# Patient Record
Sex: FEMALE | Race: WHITE | NOT HISPANIC OR LATINO | Employment: OTHER | URBAN - METROPOLITAN AREA
[De-identification: names, ages, dates, MRNs, and addresses within clinical notes are randomized per-mention and may not be internally consistent; named-entity substitution may affect disease eponyms.]

---

## 2017-01-03 RX ORDER — BLOOD SUGAR DIAGNOSTIC
STRIP MISCELLANEOUS
Qty: 100 EACH | Refills: 3 | Status: SHIPPED | OUTPATIENT
Start: 2017-01-03 | End: 2017-01-04 | Stop reason: SDUPTHER

## 2017-01-04 NOTE — TELEPHONE ENCOUNTER
Pharmacy required new script to be sent in to pharmacy with an attached diagnosis code in the notes. Resending now.

## 2017-01-04 NOTE — TELEPHONE ENCOUNTER
Pharmacy called back and the script needs to have the frequency of testing on the new one as well. Reordering now to fax.

## 2017-02-13 RX ORDER — ATORVASTATIN CALCIUM 10 MG/1
TABLET, FILM COATED ORAL
Qty: 30 TABLET | Refills: 3 | Status: SHIPPED | OUTPATIENT
Start: 2017-02-13 | End: 2017-06-21

## 2017-05-15 RX ORDER — LEVOTHYROXINE SODIUM 25 MCG
TABLET ORAL
Qty: 30 TABLET | Refills: 6 | Status: SHIPPED | OUTPATIENT
Start: 2017-05-15 | End: 2017-12-27 | Stop reason: SDUPTHER

## 2017-05-30 RX ORDER — LISINOPRIL 40 MG/1
TABLET ORAL
Qty: 90 TABLET | Refills: 3 | Status: SHIPPED | OUTPATIENT
Start: 2017-05-30 | End: 2018-06-02 | Stop reason: SDUPTHER

## 2017-06-13 DIAGNOSIS — I10 ESSENTIAL HYPERTENSION: ICD-10-CM

## 2017-06-13 DIAGNOSIS — E03.9 ACQUIRED HYPOTHYROIDISM: ICD-10-CM

## 2017-06-13 DIAGNOSIS — Z00.00 HEALTH CARE MAINTENANCE: Primary | ICD-10-CM

## 2017-06-13 DIAGNOSIS — E11.29 TYPE 2 DIABETES MELLITUS WITH MICROALBUMINURIA, WITHOUT LONG-TERM CURRENT USE OF INSULIN (HCC): ICD-10-CM

## 2017-06-13 DIAGNOSIS — R80.9 TYPE 2 DIABETES MELLITUS WITH MICROALBUMINURIA, WITHOUT LONG-TERM CURRENT USE OF INSULIN (HCC): ICD-10-CM

## 2017-06-13 DIAGNOSIS — E78.2 MIXED HYPERLIPIDEMIA: ICD-10-CM

## 2017-06-15 LAB
ALBUMIN SERPL-MCNC: 4.6 G/DL (ref 3.5–5.2)
ALBUMIN/CREAT UR: 440.2 MG/G CREAT (ref 0–30)
ALBUMIN/GLOB SERPL: 1.7 G/DL
ALP SERPL-CCNC: 124 U/L (ref 39–117)
ALT SERPL-CCNC: 16 U/L (ref 1–33)
APPEARANCE UR: CLEAR
AST SERPL-CCNC: 17 U/L (ref 1–32)
BACTERIA #/AREA URNS HPF: NORMAL /HPF
BASOPHILS # BLD AUTO: 0.08 10*3/MM3 (ref 0–0.2)
BASOPHILS NFR BLD AUTO: 1.5 % (ref 0–1.5)
BILIRUB SERPL-MCNC: 0.9 MG/DL (ref 0.1–1.2)
BILIRUB UR QL STRIP: NEGATIVE
BUN SERPL-MCNC: 17 MG/DL (ref 8–23)
BUN/CREAT SERPL: 19.5 (ref 7–25)
CALCIUM SERPL-MCNC: 9.8 MG/DL (ref 8.6–10.5)
CHLORIDE SERPL-SCNC: 96 MMOL/L (ref 98–107)
CHOLEST SERPL-MCNC: 145 MG/DL (ref 0–200)
CO2 SERPL-SCNC: 26.6 MMOL/L (ref 22–29)
COLOR UR: YELLOW
CREAT SERPL-MCNC: 0.87 MG/DL (ref 0.57–1)
CREAT UR-MCNC: 42.3 MG/DL
EOSINOPHIL # BLD AUTO: 0.2 10*3/MM3 (ref 0–0.7)
EOSINOPHIL NFR BLD AUTO: 3.8 % (ref 0.3–6.2)
EPI CELLS #/AREA URNS HPF: NORMAL /HPF
ERYTHROCYTE [DISTWIDTH] IN BLOOD BY AUTOMATED COUNT: 14.3 % (ref 11.7–13)
GLOBULIN SER CALC-MCNC: 2.7 GM/DL
GLUCOSE SERPL-MCNC: 123 MG/DL (ref 65–99)
GLUCOSE UR QL: NEGATIVE
HBA1C MFR BLD: 7 % (ref 4.8–5.6)
HCT VFR BLD AUTO: 46 % (ref 35.6–45.5)
HDLC SERPL-MCNC: 69 MG/DL (ref 40–60)
HGB BLD-MCNC: 14.8 G/DL (ref 11.9–15.5)
HGB UR QL STRIP: NEGATIVE
IMM GRANULOCYTES # BLD: 0.03 10*3/MM3 (ref 0–0.03)
IMM GRANULOCYTES NFR BLD: 0.6 % (ref 0–0.5)
KETONES UR QL STRIP: NEGATIVE
LDLC SERPL CALC-MCNC: 63 MG/DL (ref 0–100)
LEUKOCYTE ESTERASE UR QL STRIP: NEGATIVE
LYMPHOCYTES # BLD AUTO: 1.58 10*3/MM3 (ref 0.9–4.8)
LYMPHOCYTES NFR BLD AUTO: 29.9 % (ref 19.6–45.3)
MCH RBC QN AUTO: 29.8 PG (ref 26.9–32)
MCHC RBC AUTO-ENTMCNC: 32.2 G/DL (ref 32.4–36.3)
MCV RBC AUTO: 92.6 FL (ref 80.5–98.2)
MICRO URNS: ABNORMAL
MICROALBUMIN UR-MCNC: 186.2 UG/ML
MONOCYTES # BLD AUTO: 0.42 10*3/MM3 (ref 0.2–1.2)
MONOCYTES NFR BLD AUTO: 7.9 % (ref 5–12)
NEUTROPHILS # BLD AUTO: 2.98 10*3/MM3 (ref 1.9–8.1)
NEUTROPHILS NFR BLD AUTO: 56.3 % (ref 42.7–76)
NITRITE UR QL STRIP: NEGATIVE
PH UR STRIP: 7 [PH] (ref 5–7.5)
PLATELET # BLD AUTO: 253 10*3/MM3 (ref 140–500)
POTASSIUM SERPL-SCNC: 4.3 MMOL/L (ref 3.5–5.2)
PROT SERPL-MCNC: 7.3 G/DL (ref 6–8.5)
PROT UR QL STRIP: ABNORMAL
RBC # BLD AUTO: 4.97 10*6/MM3 (ref 3.9–5.2)
RBC #/AREA URNS HPF: NORMAL /HPF
SODIUM SERPL-SCNC: 139 MMOL/L (ref 136–145)
SP GR UR: 1.01 (ref 1–1.03)
TRIGL SERPL-MCNC: 66 MG/DL (ref 0–150)
TSH SERPL DL<=0.005 MIU/L-ACNC: 1.52 MIU/ML (ref 0.27–4.2)
URINALYSIS REFLEX: ABNORMAL
UROBILINOGEN UR STRIP-MCNC: 0.2 MG/DL (ref 0.2–1)
VLDLC SERPL CALC-MCNC: 13.2 MG/DL (ref 5–40)
WBC # BLD AUTO: 5.29 10*3/MM3 (ref 4.5–10.7)
WBC #/AREA URNS HPF: NORMAL /HPF

## 2017-06-21 ENCOUNTER — OFFICE VISIT (OUTPATIENT)
Dept: INTERNAL MEDICINE | Facility: CLINIC | Age: 82
End: 2017-06-21

## 2017-06-21 VITALS
HEIGHT: 63 IN | BODY MASS INDEX: 21.62 KG/M2 | SYSTOLIC BLOOD PRESSURE: 138 MMHG | RESPIRATION RATE: 18 BRPM | OXYGEN SATURATION: 97 % | WEIGHT: 122 LBS | HEART RATE: 90 BPM | DIASTOLIC BLOOD PRESSURE: 88 MMHG

## 2017-06-21 DIAGNOSIS — I10 ESSENTIAL HYPERTENSION: ICD-10-CM

## 2017-06-21 DIAGNOSIS — E11.29 TYPE 2 DIABETES MELLITUS WITH MICROALBUMINURIA, WITHOUT LONG-TERM CURRENT USE OF INSULIN (HCC): ICD-10-CM

## 2017-06-21 DIAGNOSIS — Z12.31 SCREENING MAMMOGRAM, ENCOUNTER FOR: ICD-10-CM

## 2017-06-21 DIAGNOSIS — I48.0 PAROXYSMAL ATRIAL FIBRILLATION (HCC): ICD-10-CM

## 2017-06-21 DIAGNOSIS — Z00.00 HEALTH MAINTENANCE EXAMINATION: Primary | ICD-10-CM

## 2017-06-21 DIAGNOSIS — M21.962 DEFORMITY OF BOTH FEET: ICD-10-CM

## 2017-06-21 DIAGNOSIS — M21.961 DEFORMITY OF BOTH FEET: ICD-10-CM

## 2017-06-21 DIAGNOSIS — R39.11 URINARY HESITANCY: ICD-10-CM

## 2017-06-21 DIAGNOSIS — Z23 NEED FOR PNEUMOCOCCAL VACCINATION: ICD-10-CM

## 2017-06-21 DIAGNOSIS — E11.42 DIABETIC PERIPHERAL NEUROPATHY (HCC): ICD-10-CM

## 2017-06-21 DIAGNOSIS — Z00.00 MEDICARE ANNUAL WELLNESS VISIT, SUBSEQUENT: ICD-10-CM

## 2017-06-21 DIAGNOSIS — R80.9 TYPE 2 DIABETES MELLITUS WITH MICROALBUMINURIA, WITHOUT LONG-TERM CURRENT USE OF INSULIN (HCC): ICD-10-CM

## 2017-06-21 DIAGNOSIS — E78.2 MIXED HYPERLIPIDEMIA: ICD-10-CM

## 2017-06-21 DIAGNOSIS — R39.198 DIFFICULTY URINATING: ICD-10-CM

## 2017-06-21 PROCEDURE — 96160 PT-FOCUSED HLTH RISK ASSMT: CPT | Performed by: INTERNAL MEDICINE

## 2017-06-21 PROCEDURE — G0439 PPPS, SUBSEQ VISIT: HCPCS | Performed by: INTERNAL MEDICINE

## 2017-06-21 PROCEDURE — G0009 ADMIN PNEUMOCOCCAL VACCINE: HCPCS | Performed by: INTERNAL MEDICINE

## 2017-06-21 PROCEDURE — 90732 PPSV23 VACC 2 YRS+ SUBQ/IM: CPT | Performed by: INTERNAL MEDICINE

## 2017-06-21 PROCEDURE — 99397 PER PM REEVAL EST PAT 65+ YR: CPT | Performed by: INTERNAL MEDICINE
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
## 2017-06-21 NOTE — PATIENT INSTRUCTIONS
Medicare Wellness  Personal Prevention Plan of Service     Date of Office Visit:  2017  Encounter Provider:  Lashonda Prieto MD  Place of Service:  [facility] INTERNAL MEDIC[facility]  Patient Name: Natalee Klein  :  1930    As part of the Medicare Wellness portion of your visit today, we are providing you with this personalized preventive plan of services (PPPS). This plan is based upon recommendations of the [place] Preventive Services Task Force (USPSTF) and the Advisory Committee on Immunization Practices (ACIP).    This lists the preventive care services that should be considered, and provides dates of when you are due. Items listed as completed are up-to-date and do not require any further intervention.    Health Maintenance   Topic Date Due   • DIABETIC FOOT EXAM  2016   • DXA SCAN  2016   • MEDICARE ANNUAL WELLNESS  2017   • PNEUMOCOCCAL VACCINES (65+ LOW/MEDIUM RISK) (2 of 2 - PPSV23) 2017   • MAMMOGRAM  06/15/2017   • INFLUENZA VACCINE  2017   • DIABETIC EYE EXAM  09/15/2017   • HEMOGLOBIN A1C  2017   • LIPID PANEL  2018   • TDAP/TD VACCINES (2 - Td) 2026   • ZOSTER VACCINE  Addressed       No orders of the defined types were placed in this encounter.      No Follow-up on file.

## 2017-06-23 RX ORDER — ATORVASTATIN CALCIUM 10 MG/1
TABLET, FILM COATED ORAL
Qty: 30 TABLET | Refills: 3 | OUTPATIENT
Start: 2017-06-23

## 2017-10-16 RX ORDER — LANCETS
EACH MISCELLANEOUS
Qty: 102 EACH | Refills: 2 | Status: SHIPPED | OUTPATIENT
Start: 2017-10-16

## 2017-12-06 RX ORDER — BLOOD SUGAR DIAGNOSTIC
STRIP MISCELLANEOUS
Qty: 100 EACH | Refills: 3 | Status: SHIPPED | OUTPATIENT
Start: 2017-12-06 | End: 2018-01-24 | Stop reason: SDUPTHER

## 2017-12-27 RX ORDER — LEVOTHYROXINE SODIUM 25 MCG
TABLET ORAL
Qty: 30 TABLET | Refills: 6 | Status: SHIPPED | OUTPATIENT
Start: 2017-12-27 | End: 2018-10-16 | Stop reason: SDUPTHER

## 2018-01-05 DIAGNOSIS — I10 ESSENTIAL HYPERTENSION: ICD-10-CM

## 2018-01-05 DIAGNOSIS — E03.9 ACQUIRED HYPOTHYROIDISM: ICD-10-CM

## 2018-01-05 DIAGNOSIS — R80.9 TYPE 2 DIABETES MELLITUS WITH MICROALBUMINURIA, WITHOUT LONG-TERM CURRENT USE OF INSULIN (HCC): ICD-10-CM

## 2018-01-05 DIAGNOSIS — E11.29 TYPE 2 DIABETES MELLITUS WITH MICROALBUMINURIA, WITHOUT LONG-TERM CURRENT USE OF INSULIN (HCC): ICD-10-CM

## 2018-01-05 DIAGNOSIS — E78.2 MIXED HYPERLIPIDEMIA: Primary | ICD-10-CM

## 2018-01-09 LAB
ALBUMIN SERPL-MCNC: 4.9 G/DL (ref 3.5–5.2)
ALBUMIN/GLOB SERPL: 1.7 G/DL
ALP SERPL-CCNC: 156 U/L (ref 39–117)
ALT SERPL-CCNC: 22 U/L (ref 1–33)
AST SERPL-CCNC: 23 U/L (ref 1–32)
BILIRUB SERPL-MCNC: 0.9 MG/DL (ref 0.1–1.2)
BUN SERPL-MCNC: 17 MG/DL (ref 8–23)
BUN/CREAT SERPL: 22.4 (ref 7–25)
CALCIUM SERPL-MCNC: 10 MG/DL (ref 8.6–10.5)
CHLORIDE SERPL-SCNC: 100 MMOL/L (ref 98–107)
CO2 SERPL-SCNC: 27.9 MMOL/L (ref 22–29)
CREAT SERPL-MCNC: 0.76 MG/DL (ref 0.57–1)
GLOBULIN SER CALC-MCNC: 2.9 GM/DL
GLUCOSE SERPL-MCNC: 104 MG/DL (ref 65–99)
HBA1C MFR BLD: 6.5 % (ref 4.8–5.6)
POTASSIUM SERPL-SCNC: 3.9 MMOL/L (ref 3.5–5.2)
PROT SERPL-MCNC: 7.8 G/DL (ref 6–8.5)
SODIUM SERPL-SCNC: 142 MMOL/L (ref 136–145)
TSH SERPL DL<=0.005 MIU/L-ACNC: 0.98 MIU/ML (ref 0.27–4.2)

## 2018-01-11 ENCOUNTER — OFFICE VISIT (OUTPATIENT)
Dept: INTERNAL MEDICINE | Facility: CLINIC | Age: 83
End: 2018-01-11

## 2018-01-11 VITALS
WEIGHT: 113 LBS | HEART RATE: 95 BPM | OXYGEN SATURATION: 98 % | HEIGHT: 63 IN | SYSTOLIC BLOOD PRESSURE: 152 MMHG | RESPIRATION RATE: 18 BRPM | DIASTOLIC BLOOD PRESSURE: 88 MMHG | BODY MASS INDEX: 20.02 KG/M2

## 2018-01-11 DIAGNOSIS — R63.4 ABNORMAL WEIGHT LOSS: ICD-10-CM

## 2018-01-11 DIAGNOSIS — E11.29 TYPE 2 DIABETES MELLITUS WITH MICROALBUMINURIA, WITHOUT LONG-TERM CURRENT USE OF INSULIN (HCC): ICD-10-CM

## 2018-01-11 DIAGNOSIS — R80.9 TYPE 2 DIABETES MELLITUS WITH MICROALBUMINURIA, WITHOUT LONG-TERM CURRENT USE OF INSULIN (HCC): ICD-10-CM

## 2018-01-11 DIAGNOSIS — E03.9 ACQUIRED HYPOTHYROIDISM: ICD-10-CM

## 2018-01-11 DIAGNOSIS — E78.2 MIXED HYPERLIPIDEMIA: Primary | ICD-10-CM

## 2018-01-11 DIAGNOSIS — R74.8 ELEVATED ALKALINE PHOSPHATASE LEVEL: ICD-10-CM

## 2018-01-11 DIAGNOSIS — L28.2 PRURITIC RASH: ICD-10-CM

## 2018-01-11 DIAGNOSIS — I10 ESSENTIAL HYPERTENSION: ICD-10-CM

## 2018-01-11 DIAGNOSIS — I48.0 PAROXYSMAL ATRIAL FIBRILLATION (HCC): ICD-10-CM

## 2018-01-11 LAB
Lab: NORMAL
Lab: NORMAL

## 2018-01-11 PROCEDURE — 99214 OFFICE O/P EST MOD 30 MIN: CPT | Performed by: INTERNAL MEDICINE

## 2018-01-11 NOTE — PROGRESS NOTES
Chief Complaint  Natalee Klein is a 87 y.o. female who presents for Hyperlipidemia; Hypertension; Hypothyroidism; Diabetes; and Follow-up (6 month)  .    History of Present Illness   Natalee is here for follow up on her HTN, atrial fib, HLD, hypothyroid, and DM.  She denies any cp or palp or soa.  We discussed her A1c and other labs. She spent most of the appt complaining about neck pain on the right side and ear pain that she has had for years and has seen an ENT for in the past.  She also spent a great deal of time on the derm issues.   She fell getting out of a car in December and hit her tailbone.  She was really sore for a long time.  She is using a cane now.  The only meds she is taking are her lisinopril, her Vit D and her synthroid.  She takes tylenol at night.  She hasn't taken her metoprolol or her XArelto for a long time.    She saw derm and they took a lot of biopsies.  They found that she has allergies. She was given a cream that took care of the itching.  She is using a betamethasone ointment and a TMC cream.  She has better control of the itching but it's not gone.  She is supposed to have some testing on her back.  She's frustrated by the process of medicine these days.  I have tried to explain that I have no control over how the dermatologists run their practice.      She has seem [facility] for her eye.  They are working on getting her left eye to a place where they can do some laser therapy.        Review of Systems   Cardiovascular: Negative for chest pain, dyspnea on exertion, leg swelling and palpitations.   Endocrine: Negative for polydipsia and polyuria.   Musculoskeletal: Positive for back pain, joint pain and myalgias.   Neurological: Positive for loss of balance. Negative for dizziness.       Patient Active Problem List   Diagnosis   • Stenosis of carotid artery   • Degeneration of intervertebral disc of lumbar region   • Diabetes mellitus   • Hyperlipidemia   • Hypertension   •  Hypothyroidism   • Paroxysmal atrial fibrillation   • Temporary cerebral vascular dysfunction   • Pruritic rash   • Urinary hesitancy   • Difficulty urinating   • Diabetic peripheral neuropathy   • Deformity of both feet   • Abnormal weight loss   • Elevated alkaline phosphatase level       Past Medical History:   Diagnosis Date   • Carotid artery stenosis    • DDD (degenerative disc disease), lumbar    • Diabetes mellitus    • Disease of thyroid gland    • Hyperlipidemia    • Hypertension    • Hypokalemia    • Irregular heart beat    • Osteopenia    • PAF (paroxysmal atrial fibrillation)    • Pruritus    • Sciatica    • TIA (transient ischemic attack)        Past Surgical History:   Procedure Laterality Date   • CATARACT EXTRACTION     • CHOLECYSTECTOMY     • CRANIOTOMY      excision of acoustic neuroma   • FOOT SURGERY     • HEMORRHOIDECTOMY     • HYSTERECTOMY     • NEPHRECTOMY     • THYROID SURGERY         Family History   Problem Relation Age of Onset   • Stroke Brother    • Breast cancer Other    • Heart disease Other    • Mitral valve prolapse Neg Hx        Social History     Social History   • Marital status:      Spouse name: N/A   • Number of children: N/A   • Years of education: N/A     Occupational History   • Not on file.     Social History Main Topics   • Smoking status: Former Smoker   • Smokeless tobacco: Not on file   • Alcohol use No   • Drug use: No   • Sexual activity: Defer     Other Topics Concern   • Not on file     Social History Narrative       Current Outpatient Prescriptions on File Prior to Visit   Medication Sig Dispense Refill   • ACCU-CHEK FASTCLIX LANCETS misc USE 1 DAILY 102 each 2   • ACCU-CHEK SMARTVIEW test strip TEST EVERY  each 3   • acetaminophen (TYLENOL) 500 MG tablet Take 500 mg by mouth every 6 (six) hours as needed for mild pain (1-3).     • Ascorbic Acid (VITAMIN C) 500 MG capsule Take  by mouth daily.     • Blood Glucose Calibration (ACCU-CHEK SMARTVIEW  "CONTROL VI) Accu-Chek SmartView In Vitro Strip; Patient Sig: Accu-Chek SmartView In Vitro Strip USE 1 TEST STRIP EVERY DAY; 100; 3; 19-Aug-2013; Active     • Cholecalciferol (VITAMIN D PO) Take  by mouth.     • glucose blood (ACCU-CHEK SMARTVIEW) test strip Use as instructed. 100 each 3   • glucose blood (ACCU-CHEK SMARTVIEW) test strip Use 1 Test strip every day to test blood glucose. 100 each 3   • Lancets Misc. (ACCU-CHEK FASTCLIX LANCET) kit      • lisinopril (PRINIVIL,ZESTRIL) 40 MG tablet take 1 tablet by mouth once daily 90 tablet 3   • metoprolol succinate XL (TOPROL-XL) 50 MG 24 hr tablet Take 2 tablets by mouth daily. 90 tablet 3   • SYNTHROID 25 MCG tablet take 1 tablet by mouth every morning 30 tablet 6   • travoprost, KAIT free, (TRAVATAN) 0.004 % solution ophthalmic solution Apply  to eye.     • [DISCONTINUED] rivaroxaban (XARELTO) 20 MG tablet Take 1 tablet by mouth daily with dinner. 30 tablet 11     No current facility-administered medications on file prior to visit.        Allergies   Allergen Reactions   • Amlodipine    • Codeine    • Penicillins        Vitals:    01/11/18 1421   BP: 152/88   Pulse: 95   Resp: 18   SpO2: 98%   Weight: 51.3 kg (113 lb)   Height: 160 cm (62.99\")       Body mass index is 20.02 kg/(m^2).    Objective   Physical Exam   Constitutional: She is oriented to person, place, and time. No distress.   HENT:   Head: Normocephalic and atraumatic.   Right Ear: Tympanic membrane and ear canal normal.   Left Ear: Tympanic membrane and ear canal normal.   Neck: Muscular tenderness present. Decreased range of motion present.   Chronic tightening of her trapezius and SCM muscles.  She holds her head to the right chronically.  No palpable masses.    Cardiovascular: Normal rate and regular rhythm.    Pulmonary/Chest: Effort normal and breath sounds normal. No respiratory distress. She has no wheezes. She has no rales.   Musculoskeletal: She exhibits no edema.   Neurological: She is alert " and oriented to person, place, and time. No cranial nerve deficit. Gait (very antalgic, unsteady, slow gait.  ) abnormal.   Psychiatric: She has a normal mood and affect. Her behavior is normal.       Results for orders placed or performed in visit on 01/05/18   Comprehensive Metabolic Panel   Result Value Ref Range    Glucose 104 (H) 65 - 99 mg/dL    BUN 17 8 - 23 mg/dL    Creatinine 0.76 0.57 - 1.00 mg/dL    eGFR Non African Am 72 >60 mL/min/1.73    eGFR African Am 87 >60 mL/min/1.73    BUN/Creatinine Ratio 22.4 7.0 - 25.0    Sodium 142 136 - 145 mmol/L    Potassium 3.9 3.5 - 5.2 mmol/L    Chloride 100 98 - 107 mmol/L    Total CO2 27.9 22.0 - 29.0 mmol/L    Calcium 10.0 8.6 - 10.5 mg/dL    Total Protein 7.8 6.0 - 8.5 g/dL    Albumin 4.90 3.50 - 5.20 g/dL    Globulin 2.9 gm/dL    A/G Ratio 1.7 g/dL    Total Bilirubin 0.9 0.1 - 1.2 mg/dL    Alkaline Phosphatase 156 (H) 39 - 117 U/L    AST (SGOT) 23 1 - 32 U/L    ALT (SGPT) 22 1 - 33 U/L   TSH Rfx On Abnormal To Free T4   Result Value Ref Range    TSH 0.977 0.27 - 4.2 mIU/mL   Hemoglobin A1c   Result Value Ref Range    Hemoglobin A1C 6.50 (H) 4.80 - 5.60 %       Assessment/Plan   Diagnoses and all orders for this visit:    Mixed hyperlipidemia    Essential hypertension    Type 2 diabetes mellitus with microalbuminuria, without long-term current use of insulin    Acquired hypothyroidism    Paroxysmal atrial fibrillation    Pruritic rash    Abnormal weight loss    Elevated alkaline phosphatase level        Discussion/Summary  Natalee is here for routine follow up.  It was a challenge to keep her focused on the health issues at hand. Over 30 minutes was spent with her discussing past issues and what other specialists have done.  Her bp was high today but she had not had her lisinopril. Her A1c is well controlled.  Her thyroid is well controlled.   Unfortunately, given the challenge of her office visit today, I didn't notice her weight loss until now as I am writing this  note.  I will have our office contact her.  We are going to need some additional labs and possibly imaging.  Her alk phos is elevated.  We need to determine if this is from liver or bone.  Will try to add a GGT and CBC to her existing blood work.  She has stopped taking her xarelto and given her poor balance and difficulty walking I think her risks outweigh the benefits.    Return in about 6 months (around 7/11/2018) for Annual physical, with fasting labs prior.

## 2018-01-12 LAB
BASOPHILS # BLD MANUAL: 0 10*3/MM3 (ref 0–0.2)
BASOPHILS NFR BLD MANUAL: 0 % (ref 0–1.5)
DIFFERENTIAL COMMENT: ABNORMAL
EOSINOPHIL # BLD MANUAL: 0.06 10*3/MM3 (ref 0–0.7)
EOSINOPHIL NFR BLD MANUAL: 1 % (ref 0.3–6.2)
ERYTHROCYTE [DISTWIDTH] IN BLOOD BY AUTOMATED COUNT: 13.5 % (ref 11.7–13)
GGT SERPL-CCNC: 17 U/L (ref 5–36)
HCT VFR BLD AUTO: 48.4 % (ref 35.6–45.5)
HGB BLD-MCNC: 15.2 G/DL (ref 11.9–15.5)
LYMPHOCYTES # BLD MANUAL: 2.56 10*3/MM3 (ref 0.9–4.8)
LYMPHOCYTES NFR BLD MANUAL: 45 % (ref 19.6–45.3)
MCH RBC QN AUTO: 29.2 PG (ref 26.9–32)
MCHC RBC AUTO-ENTMCNC: 31.4 G/DL (ref 32.4–36.3)
MCV RBC AUTO: 92.9 FL (ref 80.5–98.2)
MONOCYTES # BLD MANUAL: 0.23 10*3/MM3 (ref 0.2–1.2)
MONOCYTES NFR BLD MANUAL: 4 % (ref 5–12)
NEUTROPHILS # BLD MANUAL: 2.84 10*3/MM3 (ref 1.9–8.1)
NEUTROPHILS NFR BLD MANUAL: 50 % (ref 42.7–76)
PLATELET # BLD AUTO: 224 10*3/MM3 (ref 140–500)
PLATELET BLD QL SMEAR: ABNORMAL
RBC # BLD AUTO: 5.21 10*6/MM3 (ref 3.9–5.2)
RBC MORPH BLD: ABNORMAL
WBC # BLD AUTO: 5.68 10*3/MM3 (ref 4.5–10.7)
WRITTEN AUTHORIZATION: NORMAL

## 2018-01-15 ENCOUNTER — TELEPHONE (OUTPATIENT)
Dept: INTERNAL MEDICINE | Facility: CLINIC | Age: 83
End: 2018-01-15

## 2018-01-15 NOTE — TELEPHONE ENCOUNTER
----- Message from Lashonda Prieto MD sent at 1/15/2018  4:13 PM EST -----  Please call - her blood counts are all ok.  Her liver enzyme is normal.  I would like to repeat her liver enzymes in a month to see if her alkaline phosphatase goes back down.

## 2018-01-24 RX ORDER — BLOOD SUGAR DIAGNOSTIC
STRIP MISCELLANEOUS
Qty: 100 EACH | Refills: 3 | Status: SHIPPED | OUTPATIENT
Start: 2018-01-24

## 2018-01-29 ENCOUNTER — TELEPHONE (OUTPATIENT)
Dept: INTERNAL MEDICINE | Facility: CLINIC | Age: 83
End: 2018-01-29

## 2018-02-15 DIAGNOSIS — R74.8 ELEVATED ALKALINE PHOSPHATASE LEVEL: Primary | ICD-10-CM

## 2018-02-16 LAB
ALBUMIN SERPL-MCNC: 4.6 G/DL (ref 3.5–5.2)
ALP SERPL-CCNC: 101 U/L (ref 39–117)
ALT SERPL-CCNC: 21 U/L (ref 1–33)
AST SERPL-CCNC: 24 U/L (ref 1–32)
BASOPHILS # BLD AUTO: 0.11 10*3/MM3 (ref 0–0.2)
BASOPHILS NFR BLD AUTO: 1.9 % (ref 0–1.5)
BILIRUB DIRECT SERPL-MCNC: 0.4 MG/DL (ref 0–0.3)
BILIRUB SERPL-MCNC: 1.3 MG/DL (ref 0.1–1.2)
EOSINOPHIL # BLD AUTO: 0.18 10*3/MM3 (ref 0–0.7)
EOSINOPHIL NFR BLD AUTO: 3.1 % (ref 0.3–6.2)
ERYTHROCYTE [DISTWIDTH] IN BLOOD BY AUTOMATED COUNT: 14.1 % (ref 11.7–13)
HCT VFR BLD AUTO: 45.9 % (ref 35.6–45.5)
HGB BLD-MCNC: 14.6 G/DL (ref 11.9–15.5)
IMM GRANULOCYTES # BLD: 0 10*3/MM3 (ref 0–0.03)
IMM GRANULOCYTES NFR BLD: 0 % (ref 0–0.5)
LYMPHOCYTES # BLD AUTO: 1.58 10*3/MM3 (ref 0.9–4.8)
LYMPHOCYTES NFR BLD AUTO: 27.1 % (ref 19.6–45.3)
MCH RBC QN AUTO: 29 PG (ref 26.9–32)
MCHC RBC AUTO-ENTMCNC: 31.8 G/DL (ref 32.4–36.3)
MCV RBC AUTO: 91.1 FL (ref 80.5–98.2)
MONOCYTES # BLD AUTO: 0.52 10*3/MM3 (ref 0.2–1.2)
MONOCYTES NFR BLD AUTO: 8.9 % (ref 5–12)
NEUTROPHILS # BLD AUTO: 3.45 10*3/MM3 (ref 1.9–8.1)
NEUTROPHILS NFR BLD AUTO: 59 % (ref 42.7–76)
PLATELET # BLD AUTO: 219 10*3/MM3 (ref 140–500)
PROT SERPL-MCNC: 7.1 G/DL (ref 6–8.5)
RBC # BLD AUTO: 5.04 10*6/MM3 (ref 3.9–5.2)
WBC # BLD AUTO: 5.84 10*3/MM3 (ref 4.5–10.7)

## 2018-02-16 NOTE — TELEPHONE ENCOUNTER
"Spoke with pt at her lab apt today.     Stated that she had seen Paramjit a couple years ago and had discussed a cscope, she would not specify if she was having any GI issues or not, would only tell me that she \"needed to have a cscope done\".   "

## 2018-02-19 ENCOUNTER — TELEPHONE (OUTPATIENT)
Dept: INTERNAL MEDICINE | Facility: CLINIC | Age: 83
End: 2018-02-19

## 2018-02-19 DIAGNOSIS — R15.9 INCONTINENCE OF FECES, UNSPECIFIED FECAL INCONTINENCE TYPE: Primary | ICD-10-CM

## 2018-02-19 DIAGNOSIS — R63.4 UNEXPLAINED WEIGHT LOSS: ICD-10-CM

## 2018-02-19 NOTE — TELEPHONE ENCOUNTER
Patient called and has some questions for you, she personally wants to speak to you. Please advise.

## 2018-02-19 NOTE — TELEPHONE ENCOUNTER
Pt stated that she is having issues with her bowel movements coming out before she gets to the toilet. Denies any change in stool/discoloration/blood. Just the incontinence. Wants to know if she needs to go to [facility] [place] for a cscope, when she saw him a couple years ago she denied one.

## 2018-02-19 NOTE — TELEPHONE ENCOUNTER
I think we should get a CT abd and pelvis before seeing him.  I will put that order in if she is ok with that.

## 2018-02-19 NOTE — TELEPHONE ENCOUNTER
----- Message from Lashonda Prieto MD sent at 2/18/2018  3:55 PM EST -----  Please call - her liver enzyme has normalized.  Her blood counts are stable.

## 2018-02-28 ENCOUNTER — HOSPITAL ENCOUNTER (OUTPATIENT)
Dept: CT IMAGING | Facility: HOSPITAL | Age: 83
Discharge: HOME | End: 2018-02-28
Admitting: INTERNAL MEDICINE

## 2018-02-28 DIAGNOSIS — R63.4 UNEXPLAINED WEIGHT LOSS: ICD-10-CM

## 2018-02-28 DIAGNOSIS — R15.9 INCONTINENCE OF FECES, UNSPECIFIED FECAL INCONTINENCE TYPE: ICD-10-CM

## 2018-02-28 LAB — CREAT BLDA-MCNC: 0.7 MG/DL (ref 0.6–1.3)

## 2018-02-28 PROCEDURE — 74177 CT ABD & PELVIS W/CONTRAST: CPT

## 2018-02-28 PROCEDURE — 82565 ASSAY OF CREATININE: CPT

## 2018-02-28 PROCEDURE — 0 IOPAMIDOL 61 % SOLUTION: Performed by: INTERNAL MEDICINE

## 2018-02-28 PROCEDURE — 0 DIATRIZOATE MEGLUMINE & SODIUM PER 1 ML: Performed by: INTERNAL MEDICINE

## 2018-02-28 RX ADMIN — DIATRIZOATE MEGLUMINE AND DIATRIZOATE SODIUM 30 ML: 660; 100 LIQUID ORAL; RECTAL at 09:50

## 2018-02-28 RX ADMIN — IOPAMIDOL 85 ML: 612 INJECTION, SOLUTION INTRAVENOUS at 11:15

## 2018-03-01 ENCOUNTER — TELEPHONE (OUTPATIENT)
Dept: INTERNAL MEDICINE | Facility: CLINIC | Age: 83
End: 2018-03-01

## 2018-03-01 DIAGNOSIS — K86.2 PANCREATIC CYST: Primary | ICD-10-CM

## 2018-03-01 NOTE — TELEPHONE ENCOUNTER
Patient wants to know the results of her test done yesterday 2/28/18. She knows Dr. Prieto is off on Friday and wants to know the results ASAP. She does not want to wait until Monday. Please advise.

## 2018-03-05 NOTE — TELEPHONE ENCOUNTER
Please call - her CT shows some cysts in her pancreas.  These have some benign features but they can't be certain.  Radiology recommended repeating a CT in 6 months to reassess.  There is no dilation of the pancreatic ducts which is reassuring.

## 2018-03-06 PROBLEM — K86.2 PANCREATIC CYST: Status: ACTIVE | Noted: 2018-03-06

## 2018-03-27 ENCOUNTER — OFFICE VISIT (OUTPATIENT)
Dept: INTERNAL MEDICINE | Facility: CLINIC | Age: 83
End: 2018-03-27

## 2018-03-27 ENCOUNTER — HOSPITAL ENCOUNTER (OUTPATIENT)
Dept: CARDIOLOGY | Facility: HOSPITAL | Age: 83
Discharge: HOME | End: 2018-03-27
Admitting: INTERNAL MEDICINE

## 2018-03-27 VITALS
HEIGHT: 63 IN | HEART RATE: 80 BPM | OXYGEN SATURATION: 98 % | DIASTOLIC BLOOD PRESSURE: 78 MMHG | WEIGHT: 110 LBS | RESPIRATION RATE: 18 BRPM | BODY MASS INDEX: 19.49 KG/M2 | SYSTOLIC BLOOD PRESSURE: 154 MMHG

## 2018-03-27 DIAGNOSIS — L53.9 ERYTHEMA OF SKIN: ICD-10-CM

## 2018-03-27 DIAGNOSIS — M25.475 SWELLING OF FOOT JOINT, LEFT: ICD-10-CM

## 2018-03-27 DIAGNOSIS — S99.922A INJURY OF LEFT FOOT, INITIAL ENCOUNTER: Primary | ICD-10-CM

## 2018-03-27 LAB
BH CV LOW VAS LEFT SAPHENOFEMORAL JUNCTION SPONT: 1
BH CV LOWER VASCULAR LEFT COMMON FEMORAL AUGMENT: NORMAL
BH CV LOWER VASCULAR LEFT COMMON FEMORAL COMPETENT: NORMAL
BH CV LOWER VASCULAR LEFT COMMON FEMORAL COMPRESS: NORMAL
BH CV LOWER VASCULAR LEFT COMMON FEMORAL PHASIC: NORMAL
BH CV LOWER VASCULAR LEFT COMMON FEMORAL SPONT: NORMAL
BH CV LOWER VASCULAR LEFT DISTAL FEMORAL COMPRESS: NORMAL
BH CV LOWER VASCULAR LEFT GASTRONEMIUS COMPRESS: NORMAL
BH CV LOWER VASCULAR LEFT GREATER SAPH AK COMPRESS: NORMAL
BH CV LOWER VASCULAR LEFT GREATER SAPH BK COMPRESS: NORMAL
BH CV LOWER VASCULAR LEFT MID FEMORAL AUGMENT: NORMAL
BH CV LOWER VASCULAR LEFT MID FEMORAL COMPETENT: NORMAL
BH CV LOWER VASCULAR LEFT MID FEMORAL COMPRESS: NORMAL
BH CV LOWER VASCULAR LEFT MID FEMORAL PHASIC: NORMAL
BH CV LOWER VASCULAR LEFT MID FEMORAL SPONT: NORMAL
BH CV LOWER VASCULAR LEFT PERONEAL COMPRESS: NORMAL
BH CV LOWER VASCULAR LEFT POPLITEAL AUGMENT: NORMAL
BH CV LOWER VASCULAR LEFT POPLITEAL COMPETENT: NORMAL
BH CV LOWER VASCULAR LEFT POPLITEAL COMPRESS: NORMAL
BH CV LOWER VASCULAR LEFT POPLITEAL PHASIC: NORMAL
BH CV LOWER VASCULAR LEFT POPLITEAL SPONT: NORMAL
BH CV LOWER VASCULAR LEFT POSTERIOR TIBIAL COMPRESS: NORMAL
BH CV LOWER VASCULAR LEFT PROXIMAL FEMORAL COMPRESS: NORMAL
BH CV LOWER VASCULAR LEFT SAPHENOFEMORAL JUNCTION AUGMENT: NORMAL
BH CV LOWER VASCULAR LEFT SAPHENOFEMORAL JUNCTION COMPETENT: NORMAL
BH CV LOWER VASCULAR LEFT SAPHENOFEMORAL JUNCTION COMPRESS: NORMAL
BH CV LOWER VASCULAR LEFT SAPHENOFEMORAL JUNCTION PHASIC: NORMAL
BH CV LOWER VASCULAR LEFT SAPHENOFEMORAL JUNCTION SPONT: NORMAL
BH CV LOWER VASCULAR RIGHT COMMON FEMORAL AUGMENT: NORMAL
BH CV LOWER VASCULAR RIGHT COMMON FEMORAL COMPETENT: NORMAL
BH CV LOWER VASCULAR RIGHT COMMON FEMORAL COMPRESS: NORMAL
BH CV LOWER VASCULAR RIGHT COMMON FEMORAL PHASIC: NORMAL
BH CV LOWER VASCULAR RIGHT COMMON FEMORAL SPONT: NORMAL
HCT VFR BLDA CALC: 44.7 %
HGB BLDA-MCNC: 14.1 G/DL
LYMPHOCYTES # BLD: 21.7 %
MCH, POC: 28
MCHC, POC: 31.5
MCV, POC: 88.6
MONOCYTES # BLD: 9.5 %
PLATELET # BLD: 251 10*3/MM3
PMV BLD: 7.6 FL
POC NEUTROPHIL: 68.8 %
RBC, POC: 5.04
RDW, POC: 15.7
WBC # BLD: 6.2 10*3/UL

## 2018-03-27 PROCEDURE — 73630 X-RAY EXAM OF FOOT: CPT | Performed by: INTERNAL MEDICINE

## 2018-03-27 PROCEDURE — 93971 EXTREMITY STUDY: CPT

## 2018-03-27 PROCEDURE — 85025 COMPLETE CBC W/AUTO DIFF WBC: CPT | Performed by: INTERNAL MEDICINE

## 2018-03-27 PROCEDURE — 99214 OFFICE O/P EST MOD 30 MIN: CPT | Performed by: INTERNAL MEDICINE

## 2018-03-27 NOTE — PROGRESS NOTES
Chief Complaint  Natalee Klein is a 87 y.o. female who presents for Foot Injury (Dropped a wooden log on her foot last week, now swollen. )  .    History of Present Illness   Natalee is here for acute care for a new issue.  She dropped a log on her left foot at home on Monday a week ago.  It started to swell and turn red on Sunday.  It's tender when she tries to put her shoe on.  She can walk on it.  Her foot is red and warm.  It's not normally this red.  It's not normally swollen like this.  She has a lot of questions about her dermatologist and the patch testing.  I have explained that she needs to follow up with her dermatologist for this.        Review of Systems   Cardiovascular: Positive for leg swelling (left foot).   Skin: Positive for color change.   Musculoskeletal: Positive for joint pain.       Patient Active Problem List   Diagnosis   • Stenosis of carotid artery   • Degeneration of intervertebral disc of lumbar region   • Diabetes mellitus   • Hyperlipidemia   • Hypertension   • Hypothyroidism   • Paroxysmal atrial fibrillation   • Temporary cerebral vascular dysfunction   • Pruritic rash   • Urinary hesitancy   • Difficulty urinating   • Diabetic peripheral neuropathy   • Deformity of both feet   • Abnormal weight loss   • Elevated alkaline phosphatase level   • Pancreatic cyst       Past Medical History:   Diagnosis Date   • Carotid artery stenosis    • DDD (degenerative disc disease), lumbar    • Diabetes mellitus    • Disease of thyroid gland    • Hyperlipidemia    • Hypertension    • Hypokalemia    • Irregular heart beat    • Osteopenia    • PAF (paroxysmal atrial fibrillation)    • Pruritus    • Sciatica    • TIA (transient ischemic attack)        Past Surgical History:   Procedure Laterality Date   • CATARACT EXTRACTION     • CHOLECYSTECTOMY     • CRANIOTOMY      excision of acoustic neuroma   • FOOT SURGERY     • HEMORRHOIDECTOMY     • HYSTERECTOMY     • NEPHRECTOMY     • THYROID SURGERY          Family History   Problem Relation Age of Onset   • Stroke Brother    • Breast cancer Other    • Heart disease Other    • Mitral valve prolapse Neg Hx        Social History     Social History   • Marital status:      Spouse name: N/A   • Number of children: N/A   • Years of education: N/A     Occupational History   • Not on file.     Social History Main Topics   • Smoking status: Former Smoker   • Smokeless tobacco: Not on file   • Alcohol use No   • Drug use: No   • Sexual activity: Defer     Other Topics Concern   • Not on file     Social History Narrative   • No narrative on file       Current Outpatient Prescriptions on File Prior to Visit   Medication Sig Dispense Refill   • ACCU-CHEK FASTCLIX LANCETS misc USE 1 DAILY 102 each 2   • ACCU-CHEK SMARTVIEW test strip TEST EVERY  each 3   • acetaminophen (TYLENOL) 500 MG tablet Take 500 mg by mouth every 6 (six) hours as needed for mild pain (1-3).     • Ascorbic Acid (VITAMIN C) 500 MG capsule Take  by mouth daily.     • Blood Glucose Calibration (ACCU-CHEK SMARTVIEW CONTROL VI) Accu-Chek SmartView In Vitro Strip; Patient Sig: Accu-Chek SmartView In Vitro Strip USE 1 TEST STRIP EVERY DAY; 100; 3; 19-Aug-2013; Active     • Cholecalciferol (VITAMIN D PO) Take  by mouth.     • glucose blood (ACCU-CHEK SMARTVIEW) test strip Use as instructed. 100 each 3   • glucose blood (ACCU-CHEK SMARTVIEW) test strip Use 1 Test strip every day to test blood glucose. 100 each 3   • Lancets Misc. (ACCU-CHEK FASTCLIX LANCET) kit      • lisinopril (PRINIVIL,ZESTRIL) 40 MG tablet take 1 tablet by mouth once daily 90 tablet 3   • metoprolol succinate XL (TOPROL-XL) 50 MG 24 hr tablet Take 2 tablets by mouth daily. 90 tablet 3   • SYNTHROID 25 MCG tablet take 1 tablet by mouth every morning 30 tablet 6   • travoprost, BAK free, (TRAVATAN) 0.004 % solution ophthalmic solution Apply  to eye.       No current facility-administered medications on file prior to visit.   "      Allergies   Allergen Reactions   • Amlodipine    • Codeine    • Penicillins        Vitals:    03/27/18 1131   BP: 154/78   Pulse: 80   Resp: 18   SpO2: 98%   Weight: 49.9 kg (110 lb)   Height: 160 cm (62.99\")       Body mass index is 19.49 kg/m².    Objective   Physical Exam     Vascular Status -  Her left foot exhibits abnormal foot edema. Her left foot exhibits normal foot vasculature .  Skin Integrity  -   Her left foot skin is not intact..         Results for orders placed or performed in visit on 03/27/18   POC CBC With / Auto Diff   Result Value Ref Range    WBC 6.2     RBC 5.04     Hemoglobin 14.1 g/dL    Hematocrit 44.7 %    MCV 88.6     MCH 28.0     MCHC 31.5     RDW-CV 15.7     MPV 7.6     Platelets 251 10*3/mm3    Neutrophil Rel % 68.8 %    Monocyte Rel % 9.5 %    Lymphocyte Rel % 21.7 %     Xray left foot three views  Reason for exam: foot injury and pain  Comparison: none  Impression:  No fx    Assessment/Plan   Diagnoses and all orders for this visit:    Injury of left foot, initial encounter  -     XR Foot 3+ View Left (In Office)  -     POC CBC With / Auto Diff  -     Duplex Venous Lower Extremity - Left CAR    Swelling of foot joint, left  -     POC CBC With / Auto Diff  -     Duplex Venous Lower Extremity - Left CAR    Erythema of skin  -     POC CBC With / Auto Diff  -     Duplex Venous Lower Extremity - Left CAR        Discussion/Summary  Natalee is here for acute care for a new issue.  I do not see any fracture on her xrays.  Given the delay in the swelling and pain, I am concerned about possible blood clot.  Her WBC is normal with a normal diff.  I don't think this is cellulitis.  She is scheduled for a doppler today at 4.  Her son is with her and I have explained all of this to him.  Natalee has a lot of questions about her cyst on her pancreas as well.  I have explained that the radiologist recommended another CT in 6 mo.  We already have this ordered.  I have explained this to her son as " well.    No Follow-up on file.

## 2018-03-28 ENCOUNTER — TELEPHONE (OUTPATIENT)
Dept: INTERNAL MEDICINE | Facility: CLINIC | Age: 83
End: 2018-03-28

## 2018-03-28 NOTE — TELEPHONE ENCOUNTER
----- Message from Lashonda Prieto MD sent at 3/28/2018  7:59 AM EDT -----  Please call and make sure they understand that the doppler was negative for clot.  She needs to just put cold compresses on her foot twice a day for the next few days.  If the redness and swelling progresses, RTC for further evaluation.

## 2018-03-29 ENCOUNTER — TELEPHONE (OUTPATIENT)
Dept: INTERNAL MEDICINE | Facility: CLINIC | Age: 83
End: 2018-03-29

## 2018-03-29 NOTE — TELEPHONE ENCOUNTER
----- Message from Lashonda Prieto MD sent at 3/29/2018 12:37 PM EDT -----  Please call - her foot xray does not show any fracture.  Has the swelling and redness improved?

## 2018-04-02 ENCOUNTER — TELEPHONE (OUTPATIENT)
Dept: INTERNAL MEDICINE | Facility: CLINIC | Age: 83
End: 2018-04-02

## 2018-04-02 NOTE — TELEPHONE ENCOUNTER
Pt called and stated that her foot is a little better, the redness is better, but it is  and she can move it.

## 2018-04-12 ENCOUNTER — OFFICE VISIT (OUTPATIENT)
Dept: INTERNAL MEDICINE | Facility: CLINIC | Age: 83
End: 2018-04-12

## 2018-04-12 VITALS
HEIGHT: 63 IN | WEIGHT: 110 LBS | DIASTOLIC BLOOD PRESSURE: 84 MMHG | BODY MASS INDEX: 19.49 KG/M2 | HEART RATE: 91 BPM | SYSTOLIC BLOOD PRESSURE: 154 MMHG | OXYGEN SATURATION: 98 % | RESPIRATION RATE: 18 BRPM

## 2018-04-12 DIAGNOSIS — G89.29 CHRONIC PAIN OF BOTH SHOULDERS: ICD-10-CM

## 2018-04-12 DIAGNOSIS — M79.89 SWELLING OF LEFT LOWER EXTREMITY: ICD-10-CM

## 2018-04-12 DIAGNOSIS — M25.612 DECREASED RANGE OF MOTION OF LEFT SHOULDER: ICD-10-CM

## 2018-04-12 DIAGNOSIS — M79.672 LEFT FOOT PAIN: Primary | ICD-10-CM

## 2018-04-12 DIAGNOSIS — R22.1 NECK MASS: ICD-10-CM

## 2018-04-12 DIAGNOSIS — M25.511 CHRONIC PAIN OF BOTH SHOULDERS: ICD-10-CM

## 2018-04-12 DIAGNOSIS — M25.512 CHRONIC PAIN OF BOTH SHOULDERS: ICD-10-CM

## 2018-04-12 PROCEDURE — 73630 X-RAY EXAM OF FOOT: CPT | Performed by: INTERNAL MEDICINE

## 2018-04-12 PROCEDURE — 99214 OFFICE O/P EST MOD 30 MIN: CPT | Performed by: INTERNAL MEDICINE

## 2018-04-12 NOTE — PROGRESS NOTES
Chief Complaint  Natalee Klein is a 87 y.o. female who presents for Foot Injury (Pt was seen last for a foot injury, dropped a log on her foot. Still swollen and red. ) and Mass (Pt has a bump in the right side of her neck. Has had it for a long time. Sore when she turns her head, does not bother her to swallow. )  .    History of Present Illness   Natalee is here for acute care for an ongoing issue.  Her left foot is still swelling and painful.  She is able to walk on it.  We discussed again the results of her doppler and her xray.  She also has a mass on her neck on the right side that she wants to have evaluated.  She notices this when she turns her head.  It's tender when she pushes on it.      Review of Systems   Constitution: Negative for chills, fever, night sweats, weight gain and weight loss.   Cardiovascular: Positive for leg swelling (LLE).   Skin: Positive for color change (purplish discoloration in her foot). Negative for rash.   Musculoskeletal: Negative for falls and joint pain.       Patient Active Problem List   Diagnosis   • Stenosis of carotid artery   • Degeneration of intervertebral disc of lumbar region   • Diabetes mellitus   • Hyperlipidemia   • Hypertension   • Hypothyroidism   • Paroxysmal atrial fibrillation   • Temporary cerebral vascular dysfunction   • Pruritic rash   • Urinary hesitancy   • Difficulty urinating   • Diabetic peripheral neuropathy   • Deformity of both feet   • Abnormal weight loss   • Elevated alkaline phosphatase level   • Pancreatic cyst       Past Medical History:   Diagnosis Date   • Carotid artery stenosis    • DDD (degenerative disc disease), lumbar    • Diabetes mellitus    • Disease of thyroid gland    • Hyperlipidemia    • Hypertension    • Hypokalemia    • Irregular heart beat    • Osteopenia    • PAF (paroxysmal atrial fibrillation)    • Pruritus    • Sciatica    • TIA (transient ischemic attack)        Past Surgical History:   Procedure Laterality Date   •  CATARACT EXTRACTION     • CHOLECYSTECTOMY     • CRANIOTOMY      excision of acoustic neuroma   • FOOT SURGERY     • HEMORRHOIDECTOMY     • HYSTERECTOMY     • NEPHRECTOMY     • THYROID SURGERY         Family History   Problem Relation Age of Onset   • Stroke Brother    • Breast cancer Other    • Heart disease Other    • Mitral valve prolapse Neg Hx        Social History     Social History   • Marital status:      Spouse name: N/A   • Number of children: N/A   • Years of education: N/A     Occupational History   • Not on file.     Social History Main Topics   • Smoking status: Former Smoker   • Smokeless tobacco: Not on file   • Alcohol use No   • Drug use: No   • Sexual activity: Defer     Other Topics Concern   • Not on file     Social History Narrative   • No narrative on file       Current Outpatient Prescriptions on File Prior to Visit   Medication Sig Dispense Refill   • ACCU-CHEK FASTCLIX LANCETS misc USE 1 DAILY 102 each 2   • ACCU-CHEK SMARTVIEW test strip TEST EVERY  each 3   • acetaminophen (TYLENOL) 500 MG tablet Take 500 mg by mouth every 6 (six) hours as needed for mild pain (1-3).     • Ascorbic Acid (VITAMIN C) 500 MG capsule Take  by mouth daily.     • Blood Glucose Calibration (ACCU-CHEK SMARTVIEW CONTROL VI) Accu-Chek SmartView In Vitro Strip; Patient Sig: Accu-Chek SmartView In Vitro Strip USE 1 TEST STRIP EVERY DAY; 100; 3; 19-Aug-2013; Active     • Cholecalciferol (VITAMIN D PO) Take  by mouth.     • glucose blood (ACCU-CHEK SMARTVIEW) test strip Use as instructed. 100 each 3   • glucose blood (ACCU-CHEK SMARTVIEW) test strip Use 1 Test strip every day to test blood glucose. 100 each 3   • Lancets Misc. (ACCU-CHEK FASTCLIX LANCET) kit      • lisinopril (PRINIVIL,ZESTRIL) 40 MG tablet take 1 tablet by mouth once daily 90 tablet 3   • metoprolol succinate XL (TOPROL-XL) 50 MG 24 hr tablet Take 2 tablets by mouth daily. 90 tablet 3   • SYNTHROID 25 MCG tablet take 1 tablet by mouth  "every morning 30 tablet 6   • travoprost, KAIT free, (TRAVATAN) 0.004 % solution ophthalmic solution Apply  to eye.       No current facility-administered medications on file prior to visit.        Allergies   Allergen Reactions   • Amlodipine    • Codeine    • Penicillins        Vitals:    04/12/18 1112   BP: 154/84   Pulse: 91   Resp: 18   SpO2: 98%   Weight: 49.9 kg (110 lb)   Height: 160 cm (62.99\")       Body mass index is 19.49 kg/m².    Objective   Physical Exam   Constitutional: She appears well-developed and well-nourished.   Neck: Neck supple. No erythema present.         Vascular Status -  Her left foot exhibits abnormal foot edema (1+ ). Her left foot exhibits normal foot vasculature .  Skin Integrity  -   Her left foot skin is not intact..      Results for orders placed or performed in visit on 03/27/18   POC CBC With / Auto Diff   Result Value Ref Range    WBC 6.2     RBC 5.04     Hemoglobin 14.1 g/dL    Hematocrit 44.7 %    MCV 88.6     MCH 28.0     MCHC 31.5     RDW-CV 15.7     MPV 7.6     Platelets 251 10*3/mm3    Neutrophil Rel % 68.8 %    Monocyte Rel % 9.5 %    Lymphocyte Rel % 21.7 %   Duplex Venous Lower Extremity - Left CAR   Result Value Ref Range    Right Common Femoral Spont Y     Right Common Femoral Phasic Y     Right Common Femoral Augment Y     Right Common Femoral Competent Y     Right Common Femoral Compress C     Left Common Femoral Spont Y     Left Common Femoral Phasic Y     Left Common Femoral Augment Y     Left Common Femoral Competent Y     Left Common Femoral Compress C     Left Saphenofemoral Junction Spont Y     Left Saphenofemoral Junction Phasic Y     Left Saphenofemoral Junction Augment Y     Left Saphenofemoral Junction Competent N     Left Saphenofemoral Junction Compress C     Left Proximal Femoral Compress C     Left Mid Femoral Spont Y     Left Mid Femoral Phasic Y     Left Mid Femoral Augment Y     Left Mid Femoral Competent Y     Left Mid Femoral Compress C     Left " Distal Femoral Compress C     Left Popliteal Spont Y     Left Popliteal Phasic Y     Left Popliteal Augment Y     Left Popliteal Competent Y     Left Popliteal Compress C     Left Posterior Tibial Compress C     Left Peroneal Compress C     Left GastronemiusSoleal Compress C     Left Greater Saph AK Compress C     Left Greater Saph BK Compress C     Left Saphenofemoral Junction Spont 1      xr left foot 3 v  Reason for exam:  Continued pain and swelling  Comparison: 3/27/18  Impression: no fx    Assessment/Plan   Diagnoses and all orders for this visit:    Left foot pain  -     XR Foot 3+ View Left (In Office)    Swelling of left lower extremity  -     XR Foot 3+ View Left (In Office)    Neck mass  -     CT soft tissue neck w contrast; Future        Discussion/Summary  Natalee is here for follow up on her foot.  She definitely has some venous insufficiency causing swelling.  I still do not see anything on the xray that's concerning.  I have asked her to get a compression sock from [employer] for her left leg.  Her son was with her today and I relayed this information to him as well.   We are going to get imaging of her neck  No Follow-up on file.

## 2018-04-16 ENCOUNTER — TELEPHONE (OUTPATIENT)
Dept: INTERNAL MEDICINE | Facility: CLINIC | Age: 83
End: 2018-04-16

## 2018-04-16 NOTE — TELEPHONE ENCOUNTER
----- Message from Lashonda Prieto MD sent at 4/14/2018 12:01 PM EDT -----  Please call - her foot xray is still without any evidence of fracture.

## 2018-04-17 ENCOUNTER — HOSPITAL ENCOUNTER (OUTPATIENT)
Dept: CT IMAGING | Facility: HOSPITAL | Age: 83
Discharge: HOME | End: 2018-04-17
Admitting: INTERNAL MEDICINE

## 2018-04-17 DIAGNOSIS — R22.1 NECK MASS: ICD-10-CM

## 2018-04-17 LAB — CREAT BLDA-MCNC: 0.8 MG/DL (ref 0.6–1.3)

## 2018-04-17 PROCEDURE — 70491 CT SOFT TISSUE NECK W/DYE: CPT

## 2018-04-17 PROCEDURE — 82565 ASSAY OF CREATININE: CPT

## 2018-04-17 PROCEDURE — 25010000002 IOPAMIDOL 61 % SOLUTION: Performed by: INTERNAL MEDICINE

## 2018-04-17 RX ADMIN — IOPAMIDOL 75 ML: 612 INJECTION, SOLUTION INTRAVENOUS at 11:22

## 2018-04-19 ENCOUNTER — TELEPHONE (OUTPATIENT)
Dept: INTERNAL MEDICINE | Facility: CLINIC | Age: 83
End: 2018-04-19

## 2018-04-19 ENCOUNTER — APPOINTMENT (OUTPATIENT)
Dept: CT IMAGING | Facility: HOSPITAL | Age: 83
End: 2018-04-19

## 2018-04-19 DIAGNOSIS — E04.1 THYROID NODULE: Primary | ICD-10-CM

## 2018-04-19 NOTE — TELEPHONE ENCOUNTER
----- Message from Lashonda Prieto MD sent at 4/19/2018  1:30 PM EDT -----  Please call - she has a 2.5 cm thyroid nodule that needs to be further evaluated with u/s and thyroid uptake scan.  I will put those orders in.  I would also call her son so he is aware of the plan as well.

## 2018-04-24 ENCOUNTER — HOSPITAL ENCOUNTER (OUTPATIENT)
Dept: ULTRASOUND IMAGING | Facility: HOSPITAL | Age: 83
End: 2018-04-24

## 2018-04-26 ENCOUNTER — TELEPHONE (OUTPATIENT)
Dept: INTERNAL MEDICINE | Facility: CLINIC | Age: 83
End: 2018-04-26

## 2018-04-26 ENCOUNTER — APPOINTMENT (OUTPATIENT)
Dept: PHYSICAL THERAPY | Facility: HOSPITAL | Age: 83
End: 2018-04-26

## 2018-04-26 NOTE — TELEPHONE ENCOUNTER
Pt wanted to inform us that her u/s and thyroid uptake scan are scheduled on May 16th. She will be off her synthroid for 21 days.

## 2018-05-10 ENCOUNTER — HOSPITAL ENCOUNTER (OUTPATIENT)
Dept: PHYSICAL THERAPY | Facility: HOSPITAL | Age: 83
Setting detail: THERAPIES SERIES
Discharge: HOME | End: 2018-05-10

## 2018-05-10 DIAGNOSIS — M25.511 BILATERAL SHOULDER PAIN, UNSPECIFIED CHRONICITY: Primary | ICD-10-CM

## 2018-05-10 DIAGNOSIS — M25.512 BILATERAL SHOULDER PAIN, UNSPECIFIED CHRONICITY: Primary | ICD-10-CM

## 2018-05-10 DIAGNOSIS — Z78.9 DIFFICULTY WITH ACTIVITIES OF DAILY LIVING: ICD-10-CM

## 2018-05-10 DIAGNOSIS — M25.619 DECREASED SHOULDER MOBILITY, UNSPECIFIED LATERALITY: ICD-10-CM

## 2018-05-10 PROCEDURE — 97140 MANUAL THERAPY 1/> REGIONS: CPT | Performed by: PHYSICAL THERAPIST

## 2018-05-10 PROCEDURE — 97110 THERAPEUTIC EXERCISES: CPT | Performed by: PHYSICAL THERAPIST

## 2018-05-10 PROCEDURE — G8984 CARRY CURRENT STATUS: HCPCS | Performed by: PHYSICAL THERAPIST

## 2018-05-10 PROCEDURE — G8985 CARRY GOAL STATUS: HCPCS | Performed by: PHYSICAL THERAPIST

## 2018-05-10 PROCEDURE — 97162 PT EVAL MOD COMPLEX 30 MIN: CPT | Performed by: PHYSICAL THERAPIST
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
## 2018-05-16 ENCOUNTER — HOSPITAL ENCOUNTER (OUTPATIENT)
Dept: ULTRASOUND IMAGING | Facility: HOSPITAL | Age: 83
Discharge: HOME | End: 2018-05-16
Admitting: INTERNAL MEDICINE

## 2018-05-16 ENCOUNTER — HOSPITAL ENCOUNTER (OUTPATIENT)
Dept: NUCLEAR MEDICINE | Facility: HOSPITAL | Age: 83
Discharge: HOME | End: 2018-05-16

## 2018-05-16 DIAGNOSIS — E04.1 THYROID NODULE: ICD-10-CM

## 2018-05-16 PROCEDURE — 76536 US EXAM OF HEAD AND NECK: CPT

## 2018-05-16 PROCEDURE — A9516 IODINE I-123 SOD IODIDE MIC: HCPCS | Performed by: INTERNAL MEDICINE

## 2018-05-16 PROCEDURE — 0 SODIUM IODIDE 3.7 MBQ CAPSULE: Performed by: INTERNAL MEDICINE

## 2018-05-16 RX ORDER — SODIUM IODIDE I 123 100 UCI/1
1 CAPSULE, GELATIN COATED ORAL
Status: COMPLETED | OUTPATIENT
Start: 2018-05-16 | End: 2018-05-16

## 2018-05-16 RX ADMIN — Medication 1 CAPSULE: at 13:45

## 2018-05-17 ENCOUNTER — HOSPITAL ENCOUNTER (OUTPATIENT)
Dept: NUCLEAR MEDICINE | Facility: HOSPITAL | Age: 83
Discharge: HOME | End: 2018-05-17

## 2018-05-17 PROCEDURE — 78014 THYROID IMAGING W/BLOOD FLOW: CPT

## 2018-05-18 ENCOUNTER — HOSPITAL ENCOUNTER (OUTPATIENT)
Dept: PHYSICAL THERAPY | Facility: HOSPITAL | Age: 83
Setting detail: THERAPIES SERIES
Discharge: HOME | End: 2018-05-18

## 2018-05-18 DIAGNOSIS — M25.512 BILATERAL SHOULDER PAIN, UNSPECIFIED CHRONICITY: ICD-10-CM

## 2018-05-18 DIAGNOSIS — M25.619 DECREASED SHOULDER MOBILITY, UNSPECIFIED LATERALITY: Primary | ICD-10-CM

## 2018-05-18 DIAGNOSIS — Z78.9 DIFFICULTY WITH ACTIVITIES OF DAILY LIVING: ICD-10-CM

## 2018-05-18 DIAGNOSIS — M25.511 BILATERAL SHOULDER PAIN, UNSPECIFIED CHRONICITY: ICD-10-CM

## 2018-05-18 PROCEDURE — 97110 THERAPEUTIC EXERCISES: CPT | Performed by: PHYSICAL THERAPIST

## 2018-05-18 NOTE — THERAPY TREATMENT NOTE
Outpatient Physical Therapy Ortho Treatment   [facility]     Patient Name: Natalee Klein  : 1930  MRN: [id number]  Today's Date: 2018      Visit Date: 2018    Visit Dx:    ICD-10-CM ICD-9-CM   1. Decreased shoulder mobility, unspecified laterality M25.619 719.51   2. Bilateral shoulder pain, unspecified chronicity M25.511 719.41    M25.512    3. Difficulty with activities of daily living R53.81 799.3       Patient Active Problem List   Diagnosis   • Stenosis of carotid artery   • Degeneration of intervertebral disc of lumbar region   • Diabetes mellitus   • Hyperlipidemia   • Hypertension   • Hypothyroidism   • Paroxysmal atrial fibrillation   • Temporary cerebral vascular dysfunction   • Pruritic rash   • Urinary hesitancy   • Difficulty urinating   • Diabetic peripheral neuropathy   • Deformity of both feet   • Abnormal weight loss   • Elevated alkaline phosphatase level   • Pancreatic cyst        Past Medical History:   Diagnosis Date   • Carotid artery stenosis    • DDD (degenerative disc disease), lumbar    • Diabetes mellitus    • Disease of thyroid gland    • Hyperlipidemia    • Hypertension    • Hypokalemia    • Irregular heart beat    • Osteopenia    • PAF (paroxysmal atrial fibrillation)    • Pruritus    • Sciatica    • TIA (transient ischemic attack)         Past Surgical History:   Procedure Laterality Date   • CATARACT EXTRACTION     • CHOLECYSTECTOMY     • CRANIOTOMY      excision of acoustic neuroma   • FOOT SURGERY     • HEMORRHOIDECTOMY     • HYSTERECTOMY     • NEPHRECTOMY     • THYROID SURGERY                               PT Assessment/Plan     Row Name 18 1455          PT Assessment    Assessment Comments Patient returns for 1st follow up.  Patient with poor retention of initial HEP. Requires verbal, tactile and demo cues for all therex.  Fair tolerance to seated table slides and pulleys (facing) for AAROM.    -GR        PT Plan    PT Plan Comments Assess  response to new therex and progress with AAROM, gentle strengthening within patient tolerance.  Patient is a fall risk.  -GR       User Key  (r) = Recorded By, (t) = Taken By, (c) = Cosigned By    Initials Name Provider Type    GR Carlos Choudhary, PT Physical Therapist                    Exercises     Row Name 05/18/18 1300             Subjective Comments    Subjective Comments States no pain at rest, hurts when she moves, especially coffee this AM.  Doing the scapular retraction at home has trouble remembering the one on her side.  -GR         Subjective Pain    Able to rate subjective pain? yes  -GR      Pre-Treatment Pain Level 0  -GR      Post-Treatment Pain Level 0  -GR      Subjective Pain Comment Fall risk  -GR         Exercise 1    Exercise Name 1 Nustep UE focused/LE used  -GR      Cueing 1 Verbal;Tactile;Demo  -GR      Time 1 5 minutes  -GR      Additional Comments L1  -GR         Exercise 2    Exercise Name 2 Reverse shoulder rolls  -GR      Cueing 2 Verbal;Tactile;Demo  -GR      Reps 2 10  -GR         Exercise 3    Exercise Name 3 Scap retraction  -GR      Cueing 3 Verbal;Tactile;Demo  -GR      Reps 3 10  -GR         Exercise 4    Exercise Name 4 Rows and ext  -GR      Cueing 4 Verbal;Tactile;Demo  -GR      Reps 4 10  -GR      Additional Comments YTB  -GR         Exercise 5    Exercise Name 5 Bicep curl  -GR      Cueing 5 Verbal;Tactile;Demo  -GR      Reps 5 10  -GR      Additional Comments 1#  -GR         Exercise 6    Exercise Name 6 Seated pulleys flexion - facing pulleys  -GR      Cueing 6 Verbal;Tactile;Demo  -GR      Time 6 2 minutes  -GR      Additional Comments seated on top of 2 foam pads  -GR         Exercise 7    Exercise Name 7 Seated table slides - swiss ball and towel  -GR      Cueing 7 Verbal;Tactile;Demo  -GR      Reps 7 10  -GR      Additional Comments each- OK to trial towel at dining table at home  -GR         Exercise 8    Exercise Name 8 SL ER  -GR      Cueing 8 Verbal;Tactile;Demo   -GR      Sets 8 1  -GR      Reps 8 10  -GR      Additional Comments each  -GR        User Key  (r) = Recorded By, (t) = Taken By, (c) = Cosigned By    Initials Name Provider Type    ANA Choudhary PT Physical Therapist                               PT OP Goals     Row Name 05/18/18 1400          PT Short Term Goals    STG 1 Patient will be independent with initial HEP for posture and ROM/flexibility  -GR     STG 1 Progress Ongoing  -GR     STG 2 Patient will be educated on and demonstrate knowledge/understanding of optimal posture and shoulder positioning to avoid undue strain to affected tissues with daily function  -GR     STG 2 Progress Ongoing  -GR     STG 3 Patient will report >/= 25% increased ease/decreased pain with ADL's  -GR     STG 3 Progress Ongoing  -GR     STG 4 Patient will demonstrate improving postural/core strength as noted by more erect posture in clinic >/= 50% of session with minimal cuing.  -GR     STG 4 Progress Ongoing  -GR        Long Term Goals    LTG 1 Patient will be independent with established HEP for postural/shoulder/scapular strength/stabilization to aid in self management of condition/symptoms  -GR     LTG 1 Progress Ongoing  -GR     LTG 2 Patient will have improved shoulder girdle strength to 3+/5 to 4-/5 in available ROM for greater ease/tolerance with functional activities   -GR     LTG 2 Progress Ongoing  -GR     LTG 3 Patient will have improved AROM shoulder elevation for greater ease with reaching into cabinets and microwave without pain > 5/10   -GR     LTG 3 Progress Ongoing  -GR     LTG 4 Patient will report reduced functional disability using DASH with score </=  -GR     LTG 4 Progress Ongoing  -GR       User Key  (r) = Recorded By, (t) = Taken By, (c) = Cosigned By    Initials Name Provider Type    ANA Choudhary PT Physical Therapist          Therapy Education  Education Details: review of initial HEP, DOMS expectations with exercise  Given: HEP,  Symptoms/condition management  Program: Reinforced  How Provided: Verbal  Provided to: Patient  Level of Understanding: Teach back education performed, Demonstrated              Time Calculation:   Start Time: 1315  Stop Time: 1400  Time Calculation (min): 45 min  Total Timed Code Minutes- PT: 45 minute(s)    Therapy Charges for Today     Code Description Service Date Service Provider Modifiers Qty    69970212159 HC PT THER PROC EA 15 MIN 5/18/2018 Carlos Choudhary, PT GP 3                    Carlos Choudhary, PT  5/18/2018

## 2018-05-21 ENCOUNTER — TELEPHONE (OUTPATIENT)
Dept: INTERNAL MEDICINE | Facility: CLINIC | Age: 83
End: 2018-05-21

## 2018-05-22 ENCOUNTER — TELEPHONE (OUTPATIENT)
Dept: INTERNAL MEDICINE | Facility: CLINIC | Age: 83
End: 2018-05-22

## 2018-05-22 ENCOUNTER — APPOINTMENT (OUTPATIENT)
Dept: PHYSICAL THERAPY | Facility: HOSPITAL | Age: 83
End: 2018-05-22

## 2018-05-22 NOTE — TELEPHONE ENCOUNTER
----- Message from Lashonda Prieto MD sent at 5/21/2018  9:02 AM EDT -----  Please call - her thyroid up take scan was ok.  She does have extensive nodules in the remaining thyroid.  We could have her see Dr. Goyal to determine if anything further needs to be done with these.

## 2018-05-30 ENCOUNTER — HOSPITAL ENCOUNTER (OUTPATIENT)
Dept: PHYSICAL THERAPY | Facility: HOSPITAL | Age: 83
Setting detail: THERAPIES SERIES
Discharge: HOME | End: 2018-05-30

## 2018-05-30 DIAGNOSIS — M25.619 DECREASED SHOULDER MOBILITY, UNSPECIFIED LATERALITY: Primary | ICD-10-CM

## 2018-05-30 DIAGNOSIS — M25.511 BILATERAL SHOULDER PAIN, UNSPECIFIED CHRONICITY: ICD-10-CM

## 2018-05-30 DIAGNOSIS — M25.512 BILATERAL SHOULDER PAIN, UNSPECIFIED CHRONICITY: ICD-10-CM

## 2018-05-30 DIAGNOSIS — Z78.9 DIFFICULTY WITH ACTIVITIES OF DAILY LIVING: ICD-10-CM

## 2018-05-30 PROCEDURE — 97110 THERAPEUTIC EXERCISES: CPT

## 2018-05-30 PROCEDURE — 97140 MANUAL THERAPY 1/> REGIONS: CPT

## 2018-05-30 NOTE — THERAPY TREATMENT NOTE
Outpatient Physical Therapy Ortho Treatment Note   [facility]     Patient Name: Natalee Klein  : 1930  MRN: [id number]  Today's Date: 2018      Visit Date: 2018    Visit Dx:    ICD-10-CM ICD-9-CM   1. Decreased shoulder mobility, unspecified laterality M25.619 719.51   2. Bilateral shoulder pain, unspecified chronicity M25.511 719.41    M25.512    3. Difficulty with activities of daily living R53.81 799.3       Patient Active Problem List   Diagnosis   • Stenosis of carotid artery   • Degeneration of intervertebral disc of lumbar region   • Diabetes mellitus   • Hyperlipidemia   • Hypertension   • Hypothyroidism   • Paroxysmal atrial fibrillation   • Temporary cerebral vascular dysfunction   • Pruritic rash   • Urinary hesitancy   • Difficulty urinating   • Diabetic peripheral neuropathy   • Deformity of both feet   • Abnormal weight loss   • Elevated alkaline phosphatase level   • Pancreatic cyst        Past Medical History:   Diagnosis Date   • Carotid artery stenosis    • DDD (degenerative disc disease), lumbar    • Diabetes mellitus    • Disease of thyroid gland    • Hyperlipidemia    • Hypertension    • Hypokalemia    • Irregular heart beat    • Osteopenia    • PAF (paroxysmal atrial fibrillation)    • Pruritus    • Sciatica    • TIA (transient ischemic attack)         Past Surgical History:   Procedure Laterality Date   • CATARACT EXTRACTION     • CHOLECYSTECTOMY     • CRANIOTOMY      excision of acoustic neuroma   • FOOT SURGERY     • HEMORRHOIDECTOMY     • HYSTERECTOMY     • NEPHRECTOMY     • THYROID SURGERY                               PT Assessment/Plan     Row Name 18 1200          PT Assessment    Assessment Comments Pt's posture is extremely kyphotic and it affects her shoulders/scapulae positioning;  her son mentioned that she falls asleep in her recliner with her head hanging forward so we discussed the importance of lying back in the recliner so if she falls asleep  her head is supported.  Worked on facilitating mid traps and rhomboid muscles for scapular retraction while not engaging UT's, required copious verbal and tactile cuing.  Pt demonstrates substitution pattern with R shoulder elevation.  Pt is not steady on her feet and would benefit from using an AD.  -JA        PT Plan    PT Plan Comments cont with increasing awareness and control of scapular stabilizers, gentle RC strengthening, AAROM, and manual techniques, MH to neck/UT in supine if pt response was good; recommend continuing therapy  -JA       User Key  (r) = Recorded By, (t) = Taken By, (c) = Cosigned By    Initials Name Provider Type    LEVI Moss, PT Physical Therapist                Modalities     Row Name 05/30/18 1100             Subjective Pain    Pre-Treatment Pain Level 0  -JA      Subjective Pain Comment pain when she raises it 6/10  -JA         Moist Heat    MH Applied Yes  -JA      Location neck/UT in supine (required extra layer of towel)  -JA      Rx Minutes 10 mins  -JA      MH S/P Rx Yes  -JA        User Key  (r) = Recorded By, (t) = Taken By, (c) = Cosigned By    Initials Name Provider Type    LEVI Moss, PT Physical Therapist                Exercises     Row Name 05/30/18 1100             Subjective Comments    Subjective Comments It might be a little bit better.  -JA         Subjective Pain    Able to rate subjective pain? yes  -JA      Pre-Treatment Pain Level 0  -JA      Subjective Pain Comment pain when she raises it 6/10  -JA         Exercise 1    Exercise Name 1 Nustep UE focused/LE used  -JA      Cueing 1 Verbal;Tactile;Demo  -JA      Time 1 5 minutes  -JA      Additional Comments L3-L1  -JA         Exercise 2    Exercise Name 2 Reverse shoulder rolls  -JA      Cueing 2 Verbal;Tactile;Demo  -JA      Reps 2 10  -JA         Exercise 3    Exercise Name 3 Scap retraction  -JA      Cueing 3 Verbal;Tactile;Demo  -JA      Reps 3 10  -JA      Additional Comments copious  tactile cuing to facilitate rhomboids and to inhibit UTs  -JA         Exercise 4    Exercise Name 4 Wall slide micah shld w/towel  -JA      Cueing 4 Verbal;Tactile;Demo  -JA      Reps 4 5  -JA      Additional Comments --  -JA         Exercise 5    Exercise Name 5 Bicep curl  -JA      Cueing 5 --  -JA      Reps 5 --  -JA         Exercise 6    Exercise Name 6 Seated pulleys flexion - facing pulleys  -JA      Cueing 6 Verbal;Tactile;Demo  -JA      Time 6 2 minutes  -JA      Additional Comments seated on top of 2 foam pads  -JA         Exercise 7    Exercise Name 7 reverse shld rolls  -JA      Cueing 7 --  -JA      Reps 7 10  -JA         Exercise 8    Exercise Name 8 SL ER  -JA      Cueing 8 Verbal;Tactile;Demo  -JA      Sets 8 1  -JA      Reps 8 10  -JA      Additional Comments each; several cues required for form  -JA         Exercise 9    Exercise Name 9 added hooklying chest press w/cane  -JA      Cueing 9 Verbal;Tactile  -JA      Reps 9 10  -JA         Exercise 10    Exercise Name 10 added Hitchhiker AROM ER with arm at side, bent elbow  -JA      Reps 10 10  -JA        User Key  (r) = Recorded By, (t) = Taken By, (c) = Cosigned By    Initials Name Provider Type    LEVI Moss, PT Physical Therapist                        Manual Rx (last 36 hours)      Manual Treatments     Row Name 05/30/18 1100             Manual Rx 1    Manual Rx 1 Location Micah shoulder  -JA      Manual Rx 1 Type PROM all planes, gentle oscillations, attempted rhythmic stabilization on R but pt unable to perform/tolerate however she was able to on the L; multiple angle isometrics each shoulder with light resistance  -        User Key  (r) = Recorded By, (t) = Taken By, (c) = Cosigned By    Initials Name Provider Type    LEVI Moss, PT Physical Therapist                PT OP Goals     Row Name 05/30/18 1100          PT Short Term Goals    STG 1 Patient will be independent with initial HEP for posture and ROM/flexibility  -   "   STG 1 Progress Progressing  -     STG 1 Progress Comments added rev shld rolls and scap squeezes and AROM ER with arm at side \"hitchhiker  -     STG 2 Patient will be educated on and demonstrate knowledge/understanding of optimal posture and shoulder positioning to avoid undue strain to affected tissues with daily function  -JA     STG 2 Progress Ongoing  -JA     STG 2 Progress Comments requires reinforcement  -JA     STG 3 Patient will report >/= 25% increased ease/decreased pain with ADL's  -JA     STG 3 Progress Ongoing  -     STG 3 Progress Comments not yet  -JA     STG 4 Patient will demonstrate improving postural/core strength as noted by more erect posture in clinic >/= 50% of session with minimal cuing.  -JA     STG 4 Progress Ongoing  -     STG 4 Progress Comments required copious verbal and tactile cues; son reports pt falls asleep in chair with head hanging forward  -        Long Term Goals    LTG 1 Patient will be independent with established HEP for postural/shoulder/scapular strength/stabilization to aid in self management of condition/symptoms  -     LTG 1 Progress Ongoing  -     LTG 2 Patient will have improved shoulder girdle strength to 3+/5 to 4-/5 in available ROM for greater ease/tolerance with functional activities   -     LTG 2 Progress Ongoing  -     LTG 2 Progress Comments not yet  -     LTG 3 Patient will have improved AROM shoulder elevation for greater ease with reaching into cabinets and microwave without pain > 5/10   -     LTG 3 Progress Ongoing  -     LTG 3 Progress Comments not yet  -     LTG 4 Patient will report reduced functional disability using DASH with score </=  -     LTG 4 Progress Ongoing  -       User Key  (r) = Recorded By, (t) = Taken By, (c) = Cosigned By    Initials Name Provider Type    LEVI Moss, PT Physical Therapist          Therapy Education  Education Details: discussed use of heat for soreness and tension in UT's; " progressed exercises; pt's son brought up that pt falls asleep in her recliner and her head hangs forward--discussed  with pt that the head is the weight of a bowling ball pulling down on the spine and increasing her already rounded posture and adding strain and pain to muscles; suggested reclining chair and also placing small pillow or twel roll behind LB.  Given: HEP, Symptoms/condition management  Program: Progressed  How Provided: Verbal, Demonstration, Written  Provided to: Patient  Level of Understanding: Teach back education performed              Time Calculation:   Start Time: 1055  Stop Time: 1148  Time Calculation (min): 53 min  Total Timed Code Minutes- PT: 43 minute(s)    Therapy Charges for Today     Code Description Service Date Service Provider Modifiers Qty    00281809125 HC PT THER PROC EA 15 MIN 5/30/2018 Merna Moss, PT GP 2    14511727861 HC PT MANUAL THERAPY EA 15 MIN 5/30/2018 Merna Moss, PT GP 1    50782077377 HC PT HOT OR COLD PACK TREAT MCARE 5/30/2018 Merna Moss, PT GP 1                    Merna Moss PT  5/30/2018

## 2018-06-01 ENCOUNTER — HOSPITAL ENCOUNTER (OUTPATIENT)
Dept: PHYSICAL THERAPY | Facility: HOSPITAL | Age: 83
Setting detail: THERAPIES SERIES
Discharge: HOME | End: 2018-06-01

## 2018-06-01 DIAGNOSIS — M25.511 BILATERAL SHOULDER PAIN, UNSPECIFIED CHRONICITY: ICD-10-CM

## 2018-06-01 DIAGNOSIS — M25.619 DECREASED SHOULDER MOBILITY, UNSPECIFIED LATERALITY: Primary | ICD-10-CM

## 2018-06-01 DIAGNOSIS — Z78.9 DIFFICULTY WITH ACTIVITIES OF DAILY LIVING: ICD-10-CM

## 2018-06-01 DIAGNOSIS — M25.512 BILATERAL SHOULDER PAIN, UNSPECIFIED CHRONICITY: ICD-10-CM

## 2018-06-01 PROCEDURE — 97110 THERAPEUTIC EXERCISES: CPT | Performed by: PHYSICAL THERAPIST

## 2018-06-01 NOTE — THERAPY TREATMENT NOTE
Outpatient Physical Therapy Ortho Treatment Note  [facility]     Patient Name: Natalee Klein  : 1930  MRN: [id number]  Today's Date: 2018      Visit Date: 2018    Visit Dx:    ICD-10-CM ICD-9-CM   1. Decreased shoulder mobility, unspecified laterality M25.619 719.51   2. Bilateral shoulder pain, unspecified chronicity M25.511 719.41    M25.512    3. Difficulty with activities of daily living R53.81 799.3       Patient Active Problem List   Diagnosis   • Stenosis of carotid artery   • Degeneration of intervertebral disc of lumbar region   • Diabetes mellitus   • Hyperlipidemia   • Hypertension   • Hypothyroidism   • Paroxysmal atrial fibrillation   • Temporary cerebral vascular dysfunction   • Pruritic rash   • Urinary hesitancy   • Difficulty urinating   • Diabetic peripheral neuropathy   • Deformity of both feet   • Abnormal weight loss   • Elevated alkaline phosphatase level   • Pancreatic cyst        Past Medical History:   Diagnosis Date   • Carotid artery stenosis    • DDD (degenerative disc disease), lumbar    • Diabetes mellitus    • Disease of thyroid gland    • Hyperlipidemia    • Hypertension    • Hypokalemia    • Irregular heart beat    • Osteopenia    • PAF (paroxysmal atrial fibrillation)    • Pruritus    • Sciatica    • TIA (transient ischemic attack)         Past Surgical History:   Procedure Laterality Date   • CATARACT EXTRACTION     • CHOLECYSTECTOMY     • CRANIOTOMY      excision of acoustic neuroma   • FOOT SURGERY     • HEMORRHOIDECTOMY     • HYSTERECTOMY     • NEPHRECTOMY     • THYROID SURGERY                               PT Assessment/Plan     Row Name 18 1425          PT Assessment    Assessment Comments Patient continues to require tactile cueing throughout for UT inhibition and scapular facilitation.  Compensates shoulder abduction, deltoid, biceps and UT.  Audible crepitus with AAROM noted.  -GR        PT Plan    PT Plan Comments Continue POC within  tolerance.  -GR       User Key  (r) = Recorded By, (t) = Taken By, (c) = Cosigned By    Initials Name Provider Type    GR Carlos T Funmi, PT Physical Therapist                Modalities     Row Name 06/01/18 1300             Moist Heat    MH Applied Yes  -GR      Location cervical MHP in sitting  -GR      Rx Minutes Other:   5 mins  -GR      MH Prior to Rx Yes  -GR        User Key  (r) = Recorded By, (t) = Taken By, (c) = Cosigned By    Initials Name Provider Type    GR Carlos T Funmi, PT Physical Therapist                Exercises     Row Name 06/01/18 1300             Subjective Comments    Subjective Comments I can't rate the pain as a number. Sometimes it is worse than others.  -GR         Subjective Pain    Able to rate subjective pain? yes  -GR      Pre-Treatment Pain Level 0  -GR      Subjective Pain Comment at rest  -GR         Exercise 1    Exercise Name 1 Nustep UE/LE  -GR      Time 1 5 minutes  -GR      Additional Comments L1  -GR         Exercise 2    Exercise Name 2 Reverse shoulder rolls  -GR      Cueing 2 Verbal;Tactile;Demo  -GR      Reps 2 10  -GR         Exercise 3    Exercise Name 3 Scap retraction  -GR      Cueing 3 Verbal;Tactile;Demo  -GR      Reps 3 10  -GR         Exercise 4    Exercise Name 4 Wall slide kera shld w/towel  -GR      Cueing 4 Verbal;Tactile;Demo  -GR      Reps 4 10  -GR         Exercise 5    Exercise Name 5 Bicep curl  -GR      Cueing 5 Demo  -GR      Reps 5 10  -GR      Additional Comments 1# each  -GR         Exercise 6    Exercise Name 6 Seated pulleys flexion - facing pulleys  -GR      Cueing 6 Verbal;Tactile;Demo  -GR      Time 6 2 minutes  -GR         Exercise 7    Exercise Name 7 rows  -GR      Cueing 7 Verbal;Tactile;Demo  -GR      Reps 7 10  -GR      Additional Comments RTB  -GR         Exercise 8    Exercise Name 8 SL ER  -GR      Cueing 8 Verbal;Tactile;Demo  -GR      Sets 8 1  -GR      Reps 8 10  -GR      Additional Comments each side  -GR         Exercise 9     "Exercise Name 9 chest press - legs over wedge  -GR      Reps 9 10  -GR         Exercise 10    Exercise Name 10 standing \"hitch-hiker\" ER  -GR      Cueing 10 Verbal;Tactile;Demo  -GR      Reps 10 10  -GR      Additional Comments each side  -GR         Exercise 11    Exercise Name 11 AAROM B flex wand - legs over wedge  -GR      Cueing 11 Verbal;Tactile;Demo  -GR      Reps 11 7  -GR        User Key  (r) = Recorded By, (t) = Taken By, (c) = Cosigned By    Initials Name Provider Type    GR Carlos Choudhary, PT Physical Therapist                               PT OP Goals     Row Name 06/01/18 1400          PT Short Term Goals    STG 1 Patient will be independent with initial HEP for posture and ROM/flexibility  -GR     STG 1 Progress Progressing  -GR     STG 2 Patient will be educated on and demonstrate knowledge/understanding of optimal posture and shoulder positioning to avoid undue strain to affected tissues with daily function  -GR     STG 2 Progress Ongoing  -GR     STG 3 Patient will report >/= 25% increased ease/decreased pain with ADL's  -GR     STG 3 Progress Ongoing  -GR     STG 4 Patient will demonstrate improving postural/core strength as noted by more erect posture in clinic >/= 50% of session with minimal cuing.  -GR     STG 4 Progress Ongoing  -GR        Long Term Goals    LTG 1 Patient will be independent with established HEP for postural/shoulder/scapular strength/stabilization to aid in self management of condition/symptoms  -GR     LTG 1 Progress Ongoing  -GR     LTG 2 Patient will have improved shoulder girdle strength to 3+/5 to 4-/5 in available ROM for greater ease/tolerance with functional activities   -GR     LTG 2 Progress Ongoing  -GR     LTG 3 Patient will have improved AROM shoulder elevation for greater ease with reaching into cabinets and microwave without pain > 5/10   -GR     LTG 3 Progress Ongoing  -GR     LTG 4 Patient will report reduced functional disability using DASH with score </=  " -GR     LTG 4 Progress Ongoing  -GR       User Key  (r) = Recorded By, (t) = Taken By, (c) = Cosigned By    Initials Name Provider Type    GR Carlos Choudhary PT Physical Therapist          Therapy Education  Education Details: reinforced heat at home for pain control  Given: Symptoms/condition management  Program: Reinforced  How Provided: Verbal  Provided to: Patient  Level of Understanding: Teach back education performed, Verbalized              Time Calculation:   Start Time: 1315  Stop Time: 1405  Time Calculation (min): 50 min  Total Timed Code Minutes- PT: 45 minute(s)    Therapy Charges for Today     Code Description Service Date Service Provider Modifiers Qty    69311419770  PT THER PROC EA 15 MIN 6/1/2018 Carlos Choudhary, PT GP 3    [id number]  PT HOT OR COLD PACK TREAT MCARE 6/1/2018 Carlos Choudhary, PT GP 1                    Carlos Choudhary, PT  6/1/2018

## 2018-06-04 RX ORDER — LISINOPRIL 40 MG/1
TABLET ORAL
Qty: 90 TABLET | Refills: 3 | Status: SHIPPED | OUTPATIENT
Start: 2018-06-04 | End: 2019-05-17 | Stop reason: SDUPTHER

## 2018-06-07 ENCOUNTER — OFFICE VISIT (OUTPATIENT)
Dept: INTERNAL MEDICINE | Facility: CLINIC | Age: 83
End: 2018-06-07

## 2018-06-07 ENCOUNTER — APPOINTMENT (OUTPATIENT)
Dept: PHYSICAL THERAPY | Facility: HOSPITAL | Age: 83
End: 2018-06-07

## 2018-06-07 VITALS
WEIGHT: 101 LBS | HEART RATE: 78 BPM | BODY MASS INDEX: 17.89 KG/M2 | DIASTOLIC BLOOD PRESSURE: 72 MMHG | HEIGHT: 63 IN | RESPIRATION RATE: 18 BRPM | OXYGEN SATURATION: 98 % | SYSTOLIC BLOOD PRESSURE: 134 MMHG

## 2018-06-07 DIAGNOSIS — E03.9 ACQUIRED HYPOTHYROIDISM: ICD-10-CM

## 2018-06-07 DIAGNOSIS — R42 DIZZINESS: Primary | ICD-10-CM

## 2018-06-07 PROCEDURE — 99214 OFFICE O/P EST MOD 30 MIN: CPT | Performed by: INTERNAL MEDICINE

## 2018-06-07 RX ORDER — ONDANSETRON 4 MG/1
4 TABLET, FILM COATED ORAL EVERY 8 HOURS PRN
Qty: 15 TABLET | Refills: 0 | Status: SHIPPED | OUTPATIENT
Start: 2018-06-07 | End: 2019-05-10

## 2018-06-07 NOTE — PROGRESS NOTES
Chief Complaint  Natalee Klein is a 87 y.o. female who presents for Dizziness; Nausea (Pt states she has been sick since sunday afternoon and feeling dizzy. Pt has not been able to keep food down. ); and Fall (Couldn't walk without falling. Skinned her left arm, denies any other injuries. )  .    History of Present Illness   She has been dizzy since Sunday after [employer].  She fell at home.  She didn't hit her head or hurt herself at all.  She has been very dizzy since then. She isn't dizzy sitting down or lying down.  It's just when she is standing or walking.   She hasn't been able to eat at all due to nausea.  She tried crackers and escobar.  She did drink some mild and had an egg which did stay down.  She doesn't feel lightheaded or presyncopal.  She just feels like her balance is completely off.  Prior to this happening, she has been eating little bits.  Her grandson brought her today.  She wants to start looking at assisted living.  She doesn't feel like she can really live on her own well any longer.          Review of Systems   Constitution: Positive for decreased appetite, malaise/fatigue and weight loss. Negative for weakness.   Musculoskeletal: Positive for falls.   Gastrointestinal: Positive for nausea. Negative for bloating, abdominal pain and vomiting.   Neurological: Positive for dizziness and vertigo. Negative for headaches and light-headedness.       Patient Active Problem List   Diagnosis   • Stenosis of carotid artery   • Degeneration of intervertebral disc of lumbar region   • Diabetes mellitus   • Hyperlipidemia   • Hypertension   • Hypothyroidism   • Paroxysmal atrial fibrillation   • Temporary cerebral vascular dysfunction   • Pruritic rash   • Urinary hesitancy   • Difficulty urinating   • Diabetic peripheral neuropathy   • Deformity of both feet   • Abnormal weight loss   • Elevated alkaline phosphatase level   • Pancreatic cyst       Past Medical History:   Diagnosis Date   • Carotid artery  stenosis    • DDD (degenerative disc disease), lumbar    • Diabetes mellitus    • Disease of thyroid gland    • Hyperlipidemia    • Hypertension    • Hypokalemia    • Irregular heart beat    • Osteopenia    • PAF (paroxysmal atrial fibrillation)    • Pruritus    • Sciatica    • TIA (transient ischemic attack)        Past Surgical History:   Procedure Laterality Date   • CATARACT EXTRACTION     • CHOLECYSTECTOMY     • CRANIOTOMY      excision of acoustic neuroma   • FOOT SURGERY     • HEMORRHOIDECTOMY     • HYSTERECTOMY     • NEPHRECTOMY     • THYROID SURGERY         Family History   Problem Relation Age of Onset   • Stroke Brother    • Breast cancer Other    • Heart disease Other    • Mitral valve prolapse Neg Hx        Social History     Social History   • Marital status:      Spouse name: N/A   • Number of children: N/A   • Years of education: N/A     Occupational History   • Not on file.     Social History Main Topics   • Smoking status: Former Smoker   • Smokeless tobacco: Not on file   • Alcohol use No   • Drug use: No   • Sexual activity: Defer     Other Topics Concern   • Not on file     Social History Narrative   • No narrative on file       Current Outpatient Prescriptions on File Prior to Visit   Medication Sig Dispense Refill   • ACCU-CHEK FASTCLIX LANCETS misc USE 1 DAILY 102 each 2   • ACCU-CHEK SMARTVIEW test strip TEST EVERY  each 3   • acetaminophen (TYLENOL) 500 MG tablet Take 500 mg by mouth every 6 (six) hours as needed for mild pain (1-3).     • Ascorbic Acid (VITAMIN C) 500 MG capsule Take  by mouth daily.     • Blood Glucose Calibration (ACCU-CHEK SMARTVIEW CONTROL VI) Accu-Chek SmartView In Vitro Strip; Patient Sig: Accu-Chek SmartView In Vitro Strip USE 1 TEST STRIP EVERY DAY; 100; 3; 19-Aug-2013; Active     • Cholecalciferol (VITAMIN D PO) Take  by mouth.     • glucose blood (ACCU-CHEK SMARTVIEW) test strip Use as instructed. 100 each 3   • glucose blood (ACCU-CHEK SMARTVIEW)  "test strip Use 1 Test strip every day to test blood glucose. 100 each 3   • Lancets Mis. (ACCU-CHEK FASTCLIX LANCET) kit      • lisinopril (PRINIVIL,ZESTRIL) 40 MG tablet take 1 tablet by mouth once daily 90 tablet 3   • metoprolol succinate XL (TOPROL-XL) 50 MG 24 hr tablet Take 2 tablets by mouth daily. 90 tablet 3   • SYNTHROID 25 MCG tablet take 1 tablet by mouth every morning 30 tablet 6   • travoprost, KAIT free, (TRAVATAN) 0.004 % solution ophthalmic solution Apply  to eye.       No current facility-administered medications on file prior to visit.        Allergies   Allergen Reactions   • Amlodipine    • Codeine    • Penicillins        Vitals:    06/07/18 1334   BP: 134/72   Pulse: 78   Resp: 18   SpO2: 98%   Weight: 45.8 kg (101 lb)   Height: 160 cm (62.99\")       Body mass index is 17.9 kg/m².    Objective   Physical Exam   Constitutional: She is oriented to person, place, and time. No distress.   Neck: Neck supple.   Cardiovascular: Normal rate and regular rhythm.  Exam reveals no friction rub.    No murmur heard.  Pulmonary/Chest: Effort normal and breath sounds normal. No respiratory distress. She has no wheezes.   Abdominal: Soft. Bowel sounds are normal. She exhibits no distension. There is no tenderness.   Lymphadenopathy:     She has no cervical adenopathy.   Neurological: She is alert and oriented to person, place, and time. No cranial nerve deficit.   Skin: Abrasion (back of left upper arm, no sign of infection) noted. She is not diaphoretic.   Psychiatric: She has a normal mood and affect. Her behavior is normal.       Results for orders placed or performed during the hospital encounter of 04/17/18   POC Creatinine   Result Value Ref Range    Creatinine 0.80 0.60 - 1.30 mg/dL       Assessment/Plan   Diagnoses and all orders for this visit:    Dizziness  -     Comprehensive Metabolic Panel  -     CBC & Differential    Acquired hypothyroidism  -     TSH    Other orders  -     ondansetron (ZOFRAN) 4 " MG tablet; Take 1 tablet by mouth Every 8 (Eight) Hours As Needed for Nausea or Vomiting.        Discussion/Summary  Natalee is here for acute care for a new issue.  I am going to check some labs today.  I have encouraged her to try to increase PO intake and to use zofran if that helps.  If her labs look ok and she remains dizzy, will send to results for vestibular therapy.  I have encouraged her to pursue assisted living with her family.    No Follow-up on file.

## 2018-06-08 LAB
ALBUMIN SERPL-MCNC: 4.3 G/DL (ref 3.5–5.2)
ALBUMIN/GLOB SERPL: 1.7 G/DL
ALP SERPL-CCNC: 94 U/L (ref 39–117)
ALT SERPL-CCNC: 13 U/L (ref 1–33)
AST SERPL-CCNC: 16 U/L (ref 1–32)
BASOPHILS # BLD AUTO: 0.14 10*3/MM3 (ref 0–0.2)
BASOPHILS NFR BLD AUTO: 2.3 % (ref 0–1.5)
BILIRUB SERPL-MCNC: 0.8 MG/DL (ref 0.1–1.2)
BUN SERPL-MCNC: 20 MG/DL (ref 8–23)
BUN/CREAT SERPL: 23.5 (ref 7–25)
CALCIUM SERPL-MCNC: 9.4 MG/DL (ref 8.6–10.5)
CHLORIDE SERPL-SCNC: 102 MMOL/L (ref 98–107)
CO2 SERPL-SCNC: 24.8 MMOL/L (ref 22–29)
CREAT SERPL-MCNC: 0.85 MG/DL (ref 0.57–1)
EOSINOPHIL # BLD AUTO: 0.32 10*3/MM3 (ref 0–0.7)
EOSINOPHIL NFR BLD AUTO: 5.2 % (ref 0.3–6.2)
ERYTHROCYTE [DISTWIDTH] IN BLOOD BY AUTOMATED COUNT: 14.3 % (ref 11.7–13)
GFR SERPLBLD CREATININE-BSD FMLA CKD-EPI: 63 ML/MIN/1.73
GFR SERPLBLD CREATININE-BSD FMLA CKD-EPI: 77 ML/MIN/1.73
GLOBULIN SER CALC-MCNC: 2.6 GM/DL
GLUCOSE SERPL-MCNC: 94 MG/DL (ref 65–99)
HCT VFR BLD AUTO: 43.6 % (ref 35.6–45.5)
HGB BLD-MCNC: 14.3 G/DL (ref 11.9–15.5)
IMM GRANULOCYTES # BLD: 0.01 10*3/MM3 (ref 0–0.03)
IMM GRANULOCYTES NFR BLD: 0.2 % (ref 0–0.5)
LYMPHOCYTES # BLD AUTO: 1.4 10*3/MM3 (ref 0.9–4.8)
LYMPHOCYTES NFR BLD AUTO: 22.6 % (ref 19.6–45.3)
MCH RBC QN AUTO: 29.3 PG (ref 26.9–32)
MCHC RBC AUTO-ENTMCNC: 32.8 G/DL (ref 32.4–36.3)
MCV RBC AUTO: 89.3 FL (ref 80.5–98.2)
MONOCYTES # BLD AUTO: 0.38 10*3/MM3 (ref 0.2–1.2)
MONOCYTES NFR BLD AUTO: 6.1 % (ref 5–12)
NEUTROPHILS # BLD AUTO: 3.95 10*3/MM3 (ref 1.9–8.1)
NEUTROPHILS NFR BLD AUTO: 63.8 % (ref 42.7–76)
PLATELET # BLD AUTO: 193 10*3/MM3 (ref 140–500)
POTASSIUM SERPL-SCNC: 4.2 MMOL/L (ref 3.5–5.2)
PROT SERPL-MCNC: 6.9 G/DL (ref 6–8.5)
RBC # BLD AUTO: 4.88 10*6/MM3 (ref 3.9–5.2)
SODIUM SERPL-SCNC: 143 MMOL/L (ref 136–145)
TSH SERPL DL<=0.005 MIU/L-ACNC: 0.78 MIU/ML (ref 0.27–4.2)
WBC # BLD AUTO: 6.19 10*3/MM3 (ref 4.5–10.7)

## 2018-06-12 ENCOUNTER — APPOINTMENT (OUTPATIENT)
Dept: PHYSICAL THERAPY | Facility: HOSPITAL | Age: 83
End: 2018-06-12

## 2018-06-12 ENCOUNTER — TELEPHONE (OUTPATIENT)
Dept: INTERNAL MEDICINE | Facility: CLINIC | Age: 83
End: 2018-06-12

## 2018-06-12 NOTE — TELEPHONE ENCOUNTER
----- Message from Lashonda Prieto MD sent at 6/10/2018  7:44 PM EDT -----  Please call - thyroid, kidney and liver function, and blood counts are all ok.

## 2018-06-14 ENCOUNTER — HOSPITAL ENCOUNTER (OUTPATIENT)
Dept: PHYSICAL THERAPY | Facility: HOSPITAL | Age: 83
Setting detail: THERAPIES SERIES
Discharge: HOME | End: 2018-06-14

## 2018-06-14 DIAGNOSIS — M25.619 DECREASED SHOULDER MOBILITY, UNSPECIFIED LATERALITY: Primary | ICD-10-CM

## 2018-06-14 DIAGNOSIS — M25.511 BILATERAL SHOULDER PAIN, UNSPECIFIED CHRONICITY: ICD-10-CM

## 2018-06-14 DIAGNOSIS — Z78.9 DIFFICULTY WITH ACTIVITIES OF DAILY LIVING: ICD-10-CM

## 2018-06-14 DIAGNOSIS — M25.512 BILATERAL SHOULDER PAIN, UNSPECIFIED CHRONICITY: ICD-10-CM

## 2018-06-14 PROCEDURE — 97110 THERAPEUTIC EXERCISES: CPT | Performed by: PHYSICAL THERAPIST

## 2018-06-14 NOTE — THERAPY PROGRESS REPORT/RE-CERT
Outpatient Physical Therapy Ortho Treatment Note  [facility]     Patient Name: Natalee Klein  : 1930  MRN: [id number]  Today's Date: 2018      Visit Date: 2018    Visit Dx:    ICD-10-CM ICD-9-CM   1. Decreased shoulder mobility, unspecified laterality M25.619 719.51   2. Bilateral shoulder pain, unspecified chronicity M25.511 719.41    M25.512    3. Difficulty with activities of daily living R53.81 799.3       Patient Active Problem List   Diagnosis   • Stenosis of carotid artery   • Degeneration of intervertebral disc of lumbar region   • Diabetes mellitus   • Hyperlipidemia   • Hypertension   • Hypothyroidism   • Paroxysmal atrial fibrillation   • Temporary cerebral vascular dysfunction   • Pruritic rash   • Urinary hesitancy   • Difficulty urinating   • Diabetic peripheral neuropathy   • Deformity of both feet   • Abnormal weight loss   • Elevated alkaline phosphatase level   • Pancreatic cyst        Past Medical History:   Diagnosis Date   • Carotid artery stenosis    • DDD (degenerative disc disease), lumbar    • Diabetes mellitus    • Disease of thyroid gland    • Hyperlipidemia    • Hypertension    • Hypokalemia    • Irregular heart beat    • Osteopenia    • PAF (paroxysmal atrial fibrillation)    • Pruritus    • Sciatica    • TIA (transient ischemic attack)         Past Surgical History:   Procedure Laterality Date   • CATARACT EXTRACTION     • CHOLECYSTECTOMY     • CRANIOTOMY      excision of acoustic neuroma   • FOOT SURGERY     • HEMORRHOIDECTOMY     • HYSTERECTOMY     • NEPHRECTOMY     • THYROID SURGERY                               PT Assessment/Plan     Row Name 18 1416          PT Assessment    Functional Limitations Performance in self-care ADL;Limitation in home management  -RA     Impairments Pain;Posture;Impaired postural alignment;Impaired flexibility;Muscle strength;Range of motion  -RA     Assessment Comments Patient has attended evaluation and 4 f/u therapy  sessions for treatment of B shoulder pain.   She notes maybe a little improvement in her shoulders but continues to have pain/limitations with functional reaching activities.  She completes therapy exercises for her shoulder with noted compensatory strategies (2/2 postural changes and weakness).  She requires frequent verbal/tactile and visual cuing throughout session to improve quality of exercise form/technique.  She may benefit from additional therapy for further education and progression of HEP.   Note: Patient and son report near fall when walking from car into therapy building before today's appt. as well as 2 falls at home in the past week or two.  She is using/carrying quad cane but was assisted around clinic in facility w/c this visit.  Patient strongly encouraged to use her walker for all mobility (states she is using more at home).  Discussed home safety and that patient probably should not continue to live alone - son states they are in the process of looking into assisted living for patient.  Patient completes therapy exercises for her shoulder with noted compensatory strategies and requires frequent verbal/tactile and visual cuing throughout session.    -RA     Rehab Potential Fair  -RA     Patient/caregiver participated in establishment of treatment plan and goals Yes  -RA     Patient would benefit from skilled therapy intervention Yes  -RA        PT Plan    PT Frequency 1x/week;2x/week   2-4 additional weeks  -RA     Planned CPT's? PT NEUROMUSC RE-EDUCATION EA 15 MIN: 15725;PT MANUAL THERAPY EA 15 MIN: 15545;PT THER PROC EA 15 MIN: 07176;PT THER ACT EA 15 MIN: 35686;PT SELF CARE/HOME MGMT/TRAIN EA 15: 56002;PT HOT OR COLD PACK TREAT MCARE  -RA     PT Plan Comments Continue therapy as beneficial and patient tolerates.  -RA       User Key  (r) = Recorded By, (t) = Taken By, (c) = Cosigned By    Initials Name Provider Type    RA [place] Dvaid, PT Physical Therapist                Modalities     Row  Name 06/14/18 1400             Moist Heat    Patient denies application of MH Yes   states will do at home  -RA        User Key  (r) = Recorded By, (t) = Taken By, (c) = Cosigned By    Initials Name Provider Type    RA [place] David, PT Physical Therapist                Exercises     Row Name 06/14/18 1400             Subjective Comments    Subjective Comments Shoulders might be a little better but I still can't really reach (have to ask for help).  Patient/son indicate she nearly fell in  hospital parking lot coming into therapy - also reports 2 falls at home in last week or so.    -RA         Subjective Pain    Subjective Pain Comment shoulders don't really hurt when I'm not using them but really hurt when I try to reach out/up to do something  -RA         Total Minutes    58743 - PT Therapeutic Exercise Minutes 48  -RA         Exercise 1    Exercise Name 1 Nustep UE/LE  -RA      Time 1 5 minutes  -RA      Additional Comments L1  -RA         Exercise 2    Exercise Name 2 Reverse shoulder rolls  -RA      Cueing 2 Verbal;Tactile;Demo  -RA      Reps 2 10  -RA         Exercise 3    Exercise Name 3 Scap retraction  -RA      Cueing 3 Verbal;Tactile;Demo  -RA      Reps 3 10  -RA         Exercise 4    Exercise Name 4 Wall slide kera shld w/towel  -RA      Cueing 4 Verbal;Tactile;Demo  -RA      Reps 4 10  -RA         Exercise 5    Exercise Name 5 Bicep curl  -RA      Cueing 5 Demo  -RA      Reps 5 10  -RA      Additional Comments 1# ea  -RA         Exercise 6    Exercise Name 6 Seated pulleys flexion - facing pulleys  -RA      Cueing 6 Verbal;Tactile;Demo  -RA      Time 6 2 minutes  -RA         Exercise 7    Exercise Name 7 rows  -RA      Cueing 7 Verbal;Tactile;Demo  -RA      Reps 7 10  -RA      Additional Comments red  -RA         Exercise 8    Exercise Name 8 SL ER  -RA      Cueing 8 Verbal;Tactile;Demo  -RA      Sets 8 1  -RA      Reps 8 10  -RA      Additional Comments ea side  -RA         Exercise 9    Exercise Name  "9 chest press - legs over wedge  -RA      Reps 9 10  -RA         Exercise 10    Exercise Name 10 standing \"hitch-hiker\" ER  -RA      Cueing 10 Verbal;Tactile;Demo  -RA      Reps 10 10  -RA      Additional Comments ea side  -RA         Exercise 11    Exercise Name 11 AAROM B flex wand - legs over wedge  -RA      Cueing 11 Verbal;Tactile;Demo  -RA      Reps 11 7  -RA        User Key  (r) = Recorded By, (t) = Taken By, (c) = Cosigned By    Initials Name Provider Type    RA [place] David, PT Physical Therapist                               PT OP Goals     Row Name 06/14/18 1400          PT Short Term Goals    STG 1 Patient will be independent with initial HEP for posture and ROM/flexibility  -RA     STG 1 Progress Partially Met  -RA     STG 1 Progress Comments reports doing ex at home but no one to assist with posture and cuing like when in therapy clinic  -RA     STG 2 Patient will be educated on and demonstrate knowledge/understanding of optimal posture and shoulder positioning to avoid undue strain to affected tissues with daily function  -RA     STG 2 Progress Ongoing  -RA     STG 2 Progress Comments continued reinforcement required   -RA     STG 3 Patient will report >/= 25% increased ease/decreased pain with ADL's  -RA     STG 3 Progress Ongoing  -RA     STG 3 Progress Comments notes a little improvement, not able to quantify  -RA     STG 4 Patient will demonstrate improving postural/core strength as noted by more erect posture in clinic >/= 50% of session with minimal cuing.  -RA     STG 4 Progress Ongoing  -RA     STG 4 Progress Comments continues to require near constant cuing  -RA        Long Term Goals    LTG 1 Patient will be independent with established HEP for postural/shoulder/scapular strength/stabilization to aid in self management of condition/symptoms  -RA     LTG 1 Progress Ongoing  -RA     LTG 2 Patient will have improved shoulder girdle strength to 3+/5 to 4-/5 in available ROM for greater " ease/tolerance with functional activities   -RA     LTG 2 Progress Ongoing  -RA     LTG 3 Patient will have improved AROM shoulder elevation for greater ease with reaching into cabinets and microwave without pain > 5/10   -RA     LTG 3 Progress Ongoing  -RA     LTG 4 Patient will report reduced functional disability using DASH with score </=  -RA     LTG 4 Progress Ongoing  -RA     LTG 4 Progress Comments Patient took DASH home to complete and return next visit  -RA       User Key  (r) = Recorded By, (t) = Taken By, (c) = Cosigned By    Initials Name Provider Type    RA [place] David, PT Physical Therapist          Therapy Education  Given: Symptoms/condition management  Program: Reinforced  How Provided: Verbal  Provided to: Patient  Level of Understanding: Teach back education performed, Verbalized              Time Calculation:   Start Time: 1415  Stop Time: 1507  Time Calculation (min): 52 min  Total Timed Code Minutes- PT: 48 minute(s)  Therapy Suggested Charges     Code   Minutes Charges    39206 (CPT®) Hc Pt Neuromusc Re Education Ea 15 Min      29731 (CPT®) Hc Pt Ther Proc Ea 15 Min 48 3    45687 (CPT®) Hc Gait Training Ea 15 Min      29437 (CPT®) Hc Pt Therapeutic Act Ea 15 Min      70340 (CPT®) Hc Pt Manual Therapy Ea 15 Min      93453 (CPT®) Hc Pt Ther Massage- Per 15 Min      19359 (CPT®) Hc Pt Iontophoresis Ea 15 Min      57983 (CPT®) Hc Pt Elec Stim Ea-Per 15 Min      45542 (CPT®) Hc Pt Ultrasound Ea 15 Min      07919 (CPT®) Hc Pt Self Care/Mgmt/Train Ea 15 Min      Total  48 3        Therapy Charges for Today     Code Description Service Date Service Provider Modifiers Qty    58168438342 HC PT THER PROC EA 15 MIN 6/14/2018 Florida David, PT GP 3                    [place] David, PT  6/14/2018

## 2018-06-19 ENCOUNTER — HOSPITAL ENCOUNTER (OUTPATIENT)
Dept: PHYSICAL THERAPY | Facility: HOSPITAL | Age: 83
Setting detail: THERAPIES SERIES
Discharge: HOME | End: 2018-06-19

## 2018-06-19 DIAGNOSIS — Z78.9 DIFFICULTY WITH ACTIVITIES OF DAILY LIVING: ICD-10-CM

## 2018-06-19 DIAGNOSIS — M25.619 DECREASED SHOULDER MOBILITY, UNSPECIFIED LATERALITY: Primary | ICD-10-CM

## 2018-06-19 DIAGNOSIS — M25.512 BILATERAL SHOULDER PAIN, UNSPECIFIED CHRONICITY: ICD-10-CM

## 2018-06-19 DIAGNOSIS — M25.511 BILATERAL SHOULDER PAIN, UNSPECIFIED CHRONICITY: ICD-10-CM

## 2018-06-19 PROCEDURE — 97110 THERAPEUTIC EXERCISES: CPT | Performed by: PHYSICAL THERAPIST

## 2018-06-19 NOTE — THERAPY TREATMENT NOTE
Outpatient Physical Therapy Ortho Treatment Note  [facility]     Patient Name: Natalee Klein  : 1930  MRN: [id number]  Today's Date: 2018      Visit Date: 2018    Visit Dx:    ICD-10-CM ICD-9-CM   1. Decreased shoulder mobility, unspecified laterality M25.619 719.51   2. Bilateral shoulder pain, unspecified chronicity M25.511 719.41    M25.512    3. Difficulty with activities of daily living R53.81 799.3       Patient Active Problem List   Diagnosis   • Stenosis of carotid artery   • Degeneration of intervertebral disc of lumbar region   • Diabetes mellitus   • Hyperlipidemia   • Hypertension   • Hypothyroidism   • Paroxysmal atrial fibrillation   • Temporary cerebral vascular dysfunction   • Pruritic rash   • Urinary hesitancy   • Difficulty urinating   • Diabetic peripheral neuropathy   • Deformity of both feet   • Abnormal weight loss   • Elevated alkaline phosphatase level   • Pancreatic cyst        Past Medical History:   Diagnosis Date   • Carotid artery stenosis    • DDD (degenerative disc disease), lumbar    • Diabetes mellitus    • Disease of thyroid gland    • Hyperlipidemia    • Hypertension    • Hypokalemia    • Irregular heart beat    • Osteopenia    • PAF (paroxysmal atrial fibrillation)    • Pruritus    • Sciatica    • TIA (transient ischemic attack)         Past Surgical History:   Procedure Laterality Date   • CATARACT EXTRACTION     • CHOLECYSTECTOMY     • CRANIOTOMY      excision of acoustic neuroma   • FOOT SURGERY     • HEMORRHOIDECTOMY     • HYSTERECTOMY     • NEPHRECTOMY     • THYROID SURGERY                               PT Assessment/Plan     Row Name 18 1258          PT Assessment    Assessment Comments Patient reporting improvement in B shoulder pain since initiating sessions. Reports she cannot really consistently pinpoint aggravating activities. Progressed AROM and strengthening program with use of #1 hand weights. Patient tolerated well with no increase  "in shoulder pain. Assist required to ambulate around clinic as patient reported being \"unsteady\" and leaving AD in car.   -CK       User Key  (r) = Recorded By, (t) = Taken By, (c) = Cosigned By    Initials Name Provider Type    CK Kee Ley, PT Physical Therapist                    Exercises     Row Name 06/19/18 1200             Subjective Comments    Subjective Comments I feel like this is helping my shoulders. I feel yesterday at home in the bathroom but I didnt hurt anything.  -CK         Subjective Pain    Able to rate subjective pain? yes  -CK      Pre-Treatment Pain Level 4  -CK         Total Minutes    76565 - PT Therapeutic Exercise Minutes 45  -CK         Exercise 1    Exercise Name 1 Nustep UE/LE  -CK      Time 1 5 minutes  -CK      Additional Comments L2  -CK         Exercise 2    Exercise Name 2 seated shoulder ext  -CK      Reps 2 10  -CK         Exercise 3    Exercise Name 3 Scap retraction  -CK      Cueing 3 Verbal;Tactile;Demo  -CK      Reps 3 10  -CK         Exercise 4    Exercise Name 4 Wall slide kera shld w/towel  -CK      Cueing 4 Verbal;Tactile;Demo  -CK      Reps 4 12  -CK         Exercise 5    Exercise Name 5 Bicep curl  -CK      Cueing 5 Demo  -CK      Reps 5 15  -CK      Additional Comments 1# each  -CK         Exercise 6    Exercise Name 6 Seated pulleys flexion - facing pulleys  -CK      Cueing 6 Verbal;Tactile;Demo  -CK      Time 6 2 minutes  -CK         Exercise 7    Exercise Name 7 seated rows  -CK      Cueing 7 Verbal;Tactile;Demo  -CK      Reps 7 10  -CK      Additional Comments red  -CK         Exercise 8    Exercise Name 8 SL ER  -CK      Cueing 8 Verbal;Tactile;Demo  -CK      Sets 8 1  -CK      Reps 8 10  -CK      Additional Comments #1, ea dir  -CK         Exercise 9    Exercise Name 9 chest press - legs over wedge  -CK      Reps 9 10  -CK      Additional Comments #1  -CK         Exercise 10    Exercise Name 10 standing \"hitch-hiker\" ER  -CK      Cueing 10 " Verbal;Tactile;Demo  -CK      Reps 10 10  -CK      Additional Comments 1#  -CK         Exercise 11    Exercise Name 11 AAROM B flex wand - legs over wedge  -CK      Cueing 11 Verbal;Tactile;Demo  -CK      Reps 11 10  -CK      Additional Comments #1  -CK         Exercise 12    Exercise Name 12 circles, cw/ccw at 90 deg  flex  -CK      Reps 12 10  -CK      Additional Comments #1  -CK        User Key  (r) = Recorded By, (t) = Taken By, (c) = Cosigned By    Initials Name Provider Type    CK Kee Ley, PT Physical Therapist                                            Time Calculation:   Start Time: 1150  Stop Time: 1235  Time Calculation (min): 45 min  Total Timed Code Minutes- PT: 45 minute(s)  Therapy Suggested Charges     Code   Minutes Charges    17755 (CPT®) Hc Pt Neuromusc Re Education Ea 15 Min      06777 (CPT®) Hc Pt Ther Proc Ea 15 Min 45 3    25697 (CPT®) Hc Gait Training Ea 15 Min      69670 (CPT®) Hc Pt Therapeutic Act Ea 15 Min      04088 (CPT®) Hc Pt Manual Therapy Ea 15 Min      29538 (CPT®) Hc Pt Ther Massage- Per 15 Min      10644 (CPT®) Hc Pt Iontophoresis Ea 15 Min      81695 (CPT®) Hc Pt Elec Stim Ea-Per 15 Min      58650 (CPT®) Hc Pt Ultrasound Ea 15 Min      91283 (CPT®) Hc Pt Self Care/Mgmt/Train Ea 15 Min      Total  45 3        Therapy Charges for Today     Code Description Service Date Service Provider Modifiers Qty    11335500769 HC PT THER PROC EA 15 MIN 6/19/2018 Kee Ley, PT GP 3                    Kee Ley, PT  6/19/2018

## 2018-06-21 ENCOUNTER — HOSPITAL ENCOUNTER (OUTPATIENT)
Dept: PHYSICAL THERAPY | Facility: HOSPITAL | Age: 83
Setting detail: THERAPIES SERIES
Discharge: HOME | End: 2018-06-21

## 2018-06-21 DIAGNOSIS — M25.619 DECREASED SHOULDER MOBILITY, UNSPECIFIED LATERALITY: Primary | ICD-10-CM

## 2018-06-21 DIAGNOSIS — M25.511 BILATERAL SHOULDER PAIN, UNSPECIFIED CHRONICITY: ICD-10-CM

## 2018-06-21 DIAGNOSIS — M25.512 BILATERAL SHOULDER PAIN, UNSPECIFIED CHRONICITY: ICD-10-CM

## 2018-06-21 DIAGNOSIS — Z78.9 DIFFICULTY WITH ACTIVITIES OF DAILY LIVING: ICD-10-CM

## 2018-06-21 PROCEDURE — 97110 THERAPEUTIC EXERCISES: CPT | Performed by: PHYSICAL THERAPIST

## 2018-06-21 PROCEDURE — G8986 CARRY D/C STATUS: HCPCS | Performed by: PHYSICAL THERAPIST

## 2018-06-21 PROCEDURE — G8985 CARRY GOAL STATUS: HCPCS | Performed by: PHYSICAL THERAPIST

## 2018-06-21 NOTE — THERAPY DISCHARGE NOTE
"     Outpatient Physical Therapy Ortho Treatment Note/Discharge Summary  [facility]     Patient Name: Natalee Klein  : 1930  MRN: [id number]  Today's Date: 2018      Visit Date: 2018    Visit Dx:    ICD-10-CM ICD-9-CM   1. Decreased shoulder mobility, unspecified laterality M25.619 719.51   2. Bilateral shoulder pain, unspecified chronicity M25.511 719.41    M25.512    3. Difficulty with activities of daily living R53.81 799.3       Patient Active Problem List   Diagnosis   • Stenosis of carotid artery   • Degeneration of intervertebral disc of lumbar region   • Diabetes mellitus   • Hyperlipidemia   • Hypertension   • Hypothyroidism   • Paroxysmal atrial fibrillation   • Temporary cerebral vascular dysfunction   • Pruritic rash   • Urinary hesitancy   • Difficulty urinating   • Diabetic peripheral neuropathy   • Deformity of both feet   • Abnormal weight loss   • Elevated alkaline phosphatase level   • Pancreatic cyst        Past Medical History:   Diagnosis Date   • Carotid artery stenosis    • DDD (degenerative disc disease), lumbar    • Diabetes mellitus    • Disease of thyroid gland    • Hyperlipidemia    • Hypertension    • Hypokalemia    • Irregular heart beat    • Osteopenia    • PAF (paroxysmal atrial fibrillation)    • Pruritus    • Sciatica    • TIA (transient ischemic attack)         Past Surgical History:   Procedure Laterality Date   • CATARACT EXTRACTION     • CHOLECYSTECTOMY     • CRANIOTOMY      excision of acoustic neuroma   • FOOT SURGERY     • HEMORRHOIDECTOMY     • HYSTERECTOMY     • NEPHRECTOMY     • THYROID SURGERY                               PT Assessment/Plan     Row Name 18 1320          PT Assessment    Assessment Comments Reviewed all exercises with patient and son. Handouts given to assist with compliance. Patient verbalizes she would like to \"try the exercises at home for a while\" and if she needs therapy she will \"give us a call.\" Overall patient " "verbalizing improvements and demonstrating improved ease with B shoulder activities > 90 degrees. Plan to allow patient to discharge to self managment at this time.   -CK       User Key  (r) = Recorded By, (t) = Taken By, (c) = Cosigned By    Initials Name Provider Type    CK Kee GROVER Av, PT Physical Therapist                    Exercises     Row Name 06/21/18 1100             Subjective Comments    Subjective Comments I can tell it is better when I am sitting in my chair.  -CK         Subjective Pain    Able to rate subjective pain? yes  -CK      Pre-Treatment Pain Level 0  -CK         Total Minutes    92376 - PT Therapeutic Exercise Minutes 45  -CK         Exercise 1    Exercise Name 1 UBE, L1  -CK      Time 1 4 min  -CK         Exercise 2    Exercise Name 2 shoulder ext, standing  -CK      Reps 2 15  -CK      Additional Comments RTB  -CK         Exercise 3    Exercise Name 3 Scap retraction  -CK      Cueing 3 Verbal;Tactile;Demo  -CK      Reps 3 10  -CK      Additional Comments seated, edge of chair  -CK         Exercise 4    Exercise Name 4 Wall slide kera shld w/towel  -CK      Cueing 4 Verbal;Tactile;Demo  -CK      Reps 4 15  -CK         Exercise 5    Exercise Name 5 Bicep curl  -CK      Cueing 5 Demo  -CK      Reps 5 15  -CK      Additional Comments #2  -CK         Exercise 6    Exercise Name 6 Seated pulleys flexion - facing pulleys  -CK      Cueing 6 Verbal;Tactile;Demo  -CK      Time 6 2 minutes  -CK         Exercise 7    Exercise Name 7 Rows, seated  -CK      Cueing 7 Verbal;Tactile;Demo  -CK      Reps 7 15  -CK      Additional Comments RTB  -CK         Exercise 8    Exercise Name 8 SL ER  -CK      Cueing 8 Verbal;Tactile;Demo  -CK      Sets 8 1  -CK      Reps 8 10  -CK      Additional Comments #1  -CK         Exercise 9    Exercise Name 9 chest press - legs over wedge  -CK      Reps 9 15  -CK      Additional Comments #2  -CK         Exercise 10    Exercise Name 10 standing \"hitch-hiker\" ER  -CK      " "Cueing 10 Verbal;Tactile;Demo  -CK      Reps 10 10  -CK      Additional Comments #1  -CK         Exercise 11    Exercise Name 11 AAROM B flex wand - legs over wedge  -CK      Cueing 11 Verbal;Tactile;Demo  -CK      Reps 11 10  -CK      Additional Comments #2  -CK         Exercise 12    Exercise Name 12 B circles, cw/ccw at 90 deg  flex  -CK      Reps 12 10  -CK      Additional Comments #1  -CK        User Key  (r) = Recorded By, (t) = Taken By, (c) = Cosigned By    Initials Name Provider Type    CK Kee S Av, PT Physical Therapist                               PT OP Goals     Row Name 06/21/18 1300          PT Short Term Goals    STG 1 Patient will be independent with initial HEP for posture and ROM/flexibility  -CK     STG 1 Progress Partially Met  -CK     STG 1 Progress Comments reports intermittent compliance  -CK     STG 2 Patient will be educated on and demonstrate knowledge/understanding of optimal posture and shoulder positioning to avoid undue strain to affected tissues with daily function  -CK     STG 2 Progress Partially Met  -CK     STG 2 Progress Comments education performed, carryover limited at this time  -CK     STG 3 Patient will report >/= 25% increased ease/decreased pain with ADL's  -CK     STG 3 Progress Partially Met  -CK     STG 3 Progress Comments difficult to quantify but reports \"improvement\"  -CK     STG 4 Patient will demonstrate improving postural/core strength as noted by more erect posture in clinic >/= 50% of session with minimal cuing.  -CK     STG 4 Progress Partially Met  -CK     STG 4 Progress Comments cueing at times and at others she corrects herself  -CK        Long Term Goals    LTG 1 Patient will be independent with established HEP for postural/shoulder/scapular strength/stabilization to aid in self management of condition/symptoms  -CK     LTG 1 Progress Partially Met  -CK     LTG 1 Progress Comments handouts given. son present for instruction of exercises  -CK     LTG 2 " Patient will have improved shoulder girdle strength to 3+/5 to 4-/5 in available ROM for greater ease/tolerance with functional activities   -CK     LTG 2 Progress Met  -CK     LTG 3 Patient will have improved AROM shoulder elevation for greater ease with reaching into cabinets and microwave without pain > 5/10   -CK     LTG 3 Progress Met  -CK       User Key  (r) = Recorded By, (t) = Taken By, (c) = Cosigned By    Initials Name Provider Type    CK Kee Ley, PT Physical Therapist          Therapy Education  Given: HEP  Program: Reinforced  How Provided: Demonstration, Written  Provided to: Patient  Level of Understanding: Teach back education performed, Verbalized              Time Calculation:   Start Time: 1100  Stop Time: 1145  Time Calculation (min): 45 min  Therapy Suggested Charges     Code   Minutes Charges    40849 (CPT®) Hc Pt Neuromusc Re Education Ea 15 Min      97803 (CPT®) Hc Pt Ther Proc Ea 15 Min 45 3    29437 (CPT®) Hc Gait Training Ea 15 Min      89709 (CPT®) Hc Pt Therapeutic Act Ea 15 Min      24226 (CPT®) Hc Pt Manual Therapy Ea 15 Min      43293 (CPT®) Hc Pt Ther Massage- Per 15 Min      34489 (CPT®) Hc Pt Iontophoresis Ea 15 Min      55887 (CPT®) Hc Pt Elec Stim Ea-Per 15 Min      21365 (CPT®) Hc Pt Ultrasound Ea 15 Min      45372 (CPT®) Hc Pt Self Care/Mgmt/Train Ea 15 Min      Total  45 3        Therapy Charges for Today     Code Description Service Date Service Provider Modifiers Qty    99635261707 HC PT CARRY MOV HAND OBJ PROJECTED 6/21/2018 Kee Ley, PT GP, CK 1    75893779094 HC PT CARRY MOV HAND OBJ DISCHARGE 6/21/2018 Kee Ley, PT GP, CK 1    05948842195 HC PT THER PROC EA 15 MIN 6/21/2018 Kee Ley, PT GP 3          PT G-Codes  PT Professional Judgement Used?: Yes  Functional Limitation: Carrying, moving and handling objects  Carrying, Moving and Handling Objects Goal Status (): At least 40 percent but less than 60 percent impaired, limited or  restricted  Carrying, Moving and Handling Objects Discharge Status (): At least 40 percent but less than 60 percent impaired, limited or restricted     OP PT Discharge Summary  Date of Discharge: 06/21/18  Reason for Discharge: Patient/Caregiver request  Outcomes Achieved: Patient able to partially acheive established goals  Discharge Destination: Home with home program  Discharge Instructions/Additional Comments: Ms. Klein seen for 6 skilled therapy sessions to address B shoulder pain. Severe audible OA noted with overhead activities and generalized decreased strength. Progress towards goals being made. HEP established for patient to perform independently.       Kee Ley, PT  6/21/2018

## 2018-06-27 ENCOUNTER — TELEPHONE (OUTPATIENT)
Dept: INTERNAL MEDICINE | Facility: CLINIC | Age: 83
End: 2018-06-27

## 2018-06-27 DIAGNOSIS — R19.8 CHANGE IN BOWEL MOVEMENT: Primary | ICD-10-CM

## 2018-06-27 NOTE — TELEPHONE ENCOUNTER
Can somebody inform her the referral has been placed and it is okay to wait for silvia to be back from vacation.

## 2018-06-27 NOTE — TELEPHONE ENCOUNTER
Pt called and stated that she would like the name of who we refer to for cscope.    Can somebody call her and give her 's info:    Address: [id number] [facility] [address], [place] [address]  Phone: [phone]      She will not be able to hear me on the phone, since I have mostly lost my voice.         Pt would like to have a cscope done due to bm changes, has discussed this with  in the past, but forgot and now wants a cscope again. Would you want to put a referral in for a GI doctor for this pt and let them decide whether or not she needs a cscope?

## 2018-07-26 ENCOUNTER — OFFICE VISIT (OUTPATIENT)
Dept: SURGERY | Facility: CLINIC | Age: 83
End: 2018-07-26

## 2018-07-26 VITALS — HEART RATE: 75 BPM | WEIGHT: 104 LBS | OXYGEN SATURATION: 98 % | HEIGHT: 57 IN | BODY MASS INDEX: 22.44 KG/M2

## 2018-07-26 DIAGNOSIS — R19.4 BOWEL HABIT CHANGES: ICD-10-CM

## 2018-07-26 DIAGNOSIS — Z86.010 HX OF ADENOMATOUS COLONIC POLYPS: Primary | ICD-10-CM

## 2018-07-26 PROCEDURE — 99203 OFFICE O/P NEW LOW 30 MIN: CPT | Performed by: SURGERY

## 2018-07-26 NOTE — PROGRESS NOTES
SUMMARY (A/P):    88-year-old lady with change in her bowel habits and some fecal incontinence as well as history of adenomatous polyps.  Given her age and comorbidities I discussed with her options of doing nothing, rectal exam in office today possibly with Hemoccult testing, flexible sigmoidoscopy, and full colonoscopy.  The pros and cons of each approach were discussed and after our discussion she has elected to proceed with flexible sigmoidoscopy.  She understands the risks of the procedure.  Additionally, based on her symptoms I recommended that she start taking Metamucil powder 1 tablespoon every morning and I think this will significantly help with her symptoms .      CC:    Incontinence and bowel habit changes    HPI:    88-year-old presents with mild intermittent fecal incontinence on an every 2-3 day basis for at least several months.  No apparent rectal bleeding or other associated symptoms.  Has not tried any over-the-counter remedies such as Metamucil.    PSH:    Cataracts  Hemorrhoidectomy  Right nephrectomy  Hysterectomy  Cholecystectomy  Colonoscopy in 2007 and 2003, both with adenomatous polyps  Partial thyroidectomy  Excision of left acoustic neuroma    PMH:    Carotid stenosis  Degenerative disc disease  Diabetes  Hyperlipidemia  Hypothyroidism  Atrial fibrillation  Sciatica  Hypertension    FAMILY HISTORY:    Negative for colorectal cancer    SOCIAL HISTORY:   Denies tobacco use  Denies alcohol use    ALLERGIES: reviewed, in Epic    MEDICATIONS: reviewed, in Epic    ROS:  No chest pain or shortness of air.  All other systems reviewed and negative other than presenting complaints.    PHYSICAL EXAM:   Constitutional: Well-developed well-nourished, no acute distress  Vital signs: Heart rate 75, weight 104 pounds, height 57 inches, BMI 22.5  Eyes: Conjunctiva normal, sclera nonicteric, left facial hemiparasis (postsurgical)  ENMT: Hearing grossly normal, oral mucosa moist  Neck: Supple, no palpable  mass, normal thyroid, trachea midline  Respiratory: Clear to auscultation, normal inspiratory effort  Cardiovascular: Regular rate, no jugular venous distention  Gastrointestinal: Soft, nontender, no palpable mass, no hepatosplenomegaly, negative for hernia, bowel sounds normal  Lymphatics (palpable nodes):  cervical-negative, axillary-negative  Skin:  Warm, dry, no rash on visualized skin surfaces  Musculoskeletal: Slow asymmetric gait, uses cane  Psychiatric: Alert and oriented ×3, normal affect     MORGAN ORDAZ M.D.

## 2018-07-27 DIAGNOSIS — E78.2 MIXED HYPERLIPIDEMIA: ICD-10-CM

## 2018-07-27 DIAGNOSIS — I10 ESSENTIAL HYPERTENSION: ICD-10-CM

## 2018-07-27 DIAGNOSIS — R80.9 TYPE 2 DIABETES MELLITUS WITH MICROALBUMINURIA, WITHOUT LONG-TERM CURRENT USE OF INSULIN (HCC): ICD-10-CM

## 2018-07-27 DIAGNOSIS — R74.8 ELEVATED ALKALINE PHOSPHATASE LEVEL: ICD-10-CM

## 2018-07-27 DIAGNOSIS — E11.29 TYPE 2 DIABETES MELLITUS WITH MICROALBUMINURIA, WITHOUT LONG-TERM CURRENT USE OF INSULIN (HCC): ICD-10-CM

## 2018-07-27 DIAGNOSIS — Z00.00 HEALTH CARE MAINTENANCE: Primary | ICD-10-CM

## 2018-07-27 DIAGNOSIS — E03.9 ACQUIRED HYPOTHYROIDISM: ICD-10-CM

## 2018-07-31 LAB
ALBUMIN SERPL-MCNC: 4.6 G/DL (ref 3.5–5.2)
ALBUMIN/CREAT UR: 284.3 MG/G CREAT (ref 0–30)
ALBUMIN/GLOB SERPL: 1.9 G/DL
ALP SERPL-CCNC: 94 U/L (ref 39–117)
ALT SERPL-CCNC: 15 U/L (ref 1–33)
APPEARANCE UR: CLEAR
AST SERPL-CCNC: 20 U/L (ref 1–32)
BACTERIA #/AREA URNS HPF: NORMAL /HPF
BASOPHILS # BLD AUTO: 0.13 10*3/MM3 (ref 0–0.2)
BASOPHILS NFR BLD AUTO: 2.2 % (ref 0–1.5)
BILIRUB SERPL-MCNC: 1 MG/DL (ref 0.1–1.2)
BILIRUB UR QL STRIP: NEGATIVE
BUN SERPL-MCNC: 19 MG/DL (ref 8–23)
BUN/CREAT SERPL: 25.7 (ref 7–25)
CALCIUM SERPL-MCNC: 10.1 MG/DL (ref 8.6–10.5)
CHLORIDE SERPL-SCNC: 102 MMOL/L (ref 98–107)
CHOLEST SERPL-MCNC: 198 MG/DL (ref 0–200)
CO2 SERPL-SCNC: 28.2 MMOL/L (ref 22–29)
COLOR UR: YELLOW
CREAT SERPL-MCNC: 0.74 MG/DL (ref 0.57–1)
CREAT UR-MCNC: 28.1 MG/DL
EOSINOPHIL # BLD AUTO: 0.28 10*3/MM3 (ref 0–0.7)
EOSINOPHIL NFR BLD AUTO: 4.7 % (ref 0.3–6.2)
EPI CELLS #/AREA URNS HPF: NORMAL /HPF
ERYTHROCYTE [DISTWIDTH] IN BLOOD BY AUTOMATED COUNT: 14.3 % (ref 11.7–13)
GLOBULIN SER CALC-MCNC: 2.4 GM/DL
GLUCOSE SERPL-MCNC: 91 MG/DL (ref 65–99)
GLUCOSE UR QL: NEGATIVE
HBA1C MFR BLD: 6.45 % (ref 4.8–5.6)
HCT VFR BLD AUTO: 45 % (ref 35.6–45.5)
HDLC SERPL-MCNC: 70 MG/DL (ref 40–60)
HGB BLD-MCNC: 14.7 G/DL (ref 11.9–15.5)
HGB UR QL STRIP: NEGATIVE
IMM GRANULOCYTES # BLD: 0.01 10*3/MM3 (ref 0–0.03)
IMM GRANULOCYTES NFR BLD: 0.2 % (ref 0–0.5)
KETONES UR QL STRIP: NEGATIVE
LDLC SERPL CALC-MCNC: 116 MG/DL (ref 0–100)
LEUKOCYTE ESTERASE UR QL STRIP: NEGATIVE
LYMPHOCYTES # BLD AUTO: 1.75 10*3/MM3 (ref 0.9–4.8)
LYMPHOCYTES NFR BLD AUTO: 29.3 % (ref 19.6–45.3)
MCH RBC QN AUTO: 29.6 PG (ref 26.9–32)
MCHC RBC AUTO-ENTMCNC: 32.7 G/DL (ref 32.4–36.3)
MCV RBC AUTO: 90.5 FL (ref 80.5–98.2)
MICRO URNS: NORMAL
MICRO URNS: NORMAL
MICROALBUMIN UR-MCNC: 79.9 UG/ML
MONOCYTES # BLD AUTO: 0.49 10*3/MM3 (ref 0.2–1.2)
MONOCYTES NFR BLD AUTO: 8.2 % (ref 5–12)
NEUTROPHILS # BLD AUTO: 3.32 10*3/MM3 (ref 1.9–8.1)
NEUTROPHILS NFR BLD AUTO: 55.6 % (ref 42.7–76)
NITRITE UR QL STRIP: NEGATIVE
PH UR STRIP: 7.5 [PH] (ref 5–7.5)
PLATELET # BLD AUTO: 214 10*3/MM3 (ref 140–500)
POTASSIUM SERPL-SCNC: 4.1 MMOL/L (ref 3.5–5.2)
PROT SERPL-MCNC: 7 G/DL (ref 6–8.5)
PROT UR QL STRIP: NORMAL
RBC # BLD AUTO: 4.97 10*6/MM3 (ref 3.9–5.2)
RBC #/AREA URNS HPF: NORMAL /HPF
SODIUM SERPL-SCNC: 143 MMOL/L (ref 136–145)
SP GR UR: 1.01 (ref 1–1.03)
TRIGL SERPL-MCNC: 60 MG/DL (ref 0–150)
TSH SERPL DL<=0.005 MIU/L-ACNC: 0.91 MIU/ML (ref 0.27–4.2)
URINALYSIS REFLEX: NORMAL
UROBILINOGEN UR STRIP-MCNC: 1 MG/DL (ref 0.2–1)
VLDLC SERPL CALC-MCNC: 12 MG/DL (ref 5–40)
WBC # BLD AUTO: 5.97 10*3/MM3 (ref 4.5–10.7)
WBC #/AREA URNS HPF: NORMAL /HPF

## 2018-08-02 ENCOUNTER — TELEPHONE (OUTPATIENT)
Dept: SURGERY | Facility: CLINIC | Age: 83
End: 2018-08-02

## 2018-08-02 ENCOUNTER — OFFICE VISIT (OUTPATIENT)
Dept: INTERNAL MEDICINE | Facility: CLINIC | Age: 83
End: 2018-08-02

## 2018-08-02 VITALS
WEIGHT: 104 LBS | DIASTOLIC BLOOD PRESSURE: 76 MMHG | OXYGEN SATURATION: 99 % | HEART RATE: 77 BPM | RESPIRATION RATE: 18 BRPM | BODY MASS INDEX: 22.44 KG/M2 | HEIGHT: 57 IN | SYSTOLIC BLOOD PRESSURE: 132 MMHG

## 2018-08-02 DIAGNOSIS — K86.2 PANCREATIC CYST: ICD-10-CM

## 2018-08-02 DIAGNOSIS — Z00.00 MEDICARE ANNUAL WELLNESS VISIT, SUBSEQUENT: ICD-10-CM

## 2018-08-02 DIAGNOSIS — Z12.31 ENCOUNTER FOR SCREENING MAMMOGRAM FOR BREAST CANCER: ICD-10-CM

## 2018-08-02 DIAGNOSIS — R15.9 INCONTINENCE OF FECES, UNSPECIFIED FECAL INCONTINENCE TYPE: ICD-10-CM

## 2018-08-02 DIAGNOSIS — Z00.00 HEALTH MAINTENANCE EXAMINATION: Primary | ICD-10-CM

## 2018-08-02 DIAGNOSIS — R80.9 TYPE 2 DIABETES MELLITUS WITH MICROALBUMINURIA, WITHOUT LONG-TERM CURRENT USE OF INSULIN (HCC): ICD-10-CM

## 2018-08-02 DIAGNOSIS — R19.4 BOWEL HABIT CHANGES: ICD-10-CM

## 2018-08-02 DIAGNOSIS — E78.2 MIXED HYPERLIPIDEMIA: ICD-10-CM

## 2018-08-02 DIAGNOSIS — I10 ESSENTIAL HYPERTENSION: ICD-10-CM

## 2018-08-02 DIAGNOSIS — E11.29 TYPE 2 DIABETES MELLITUS WITH MICROALBUMINURIA, WITHOUT LONG-TERM CURRENT USE OF INSULIN (HCC): ICD-10-CM

## 2018-08-02 DIAGNOSIS — E03.9 ACQUIRED HYPOTHYROIDISM: ICD-10-CM

## 2018-08-02 PROCEDURE — 96160 PT-FOCUSED HLTH RISK ASSMT: CPT | Performed by: INTERNAL MEDICINE

## 2018-08-02 PROCEDURE — 99397 PER PM REEVAL EST PAT 65+ YR: CPT | Performed by: INTERNAL MEDICINE

## 2018-08-02 PROCEDURE — G0439 PPPS, SUBSEQ VISIT: HCPCS | Performed by: INTERNAL MEDICINE

## 2018-08-02 NOTE — PROGRESS NOTES
Medicare Annual Wellness Visit    Chief Complaint:  Annual Exam (SUB AWV & CPE)      History of Present Illness    Natalee Klein is a 88 y.o. female who presents for an Annual Wellness Visit. In addition, we addressed the following health issues:    Mammo: Has not done her mammo this year.   Pap: N/A  DEXA: N/A  C-scope: N/A  Dental Exam: Yearly, utd  Eye Exam: Yearly, utd  Exercise: Very limited mobility. No formal exercise.     Advanced Care Planning:  has an advance directive - a copy has been provided and is in file   Immunization History   Administered Date(s) Administered   • Flu Vaccine High Dose PF 65YR+ 10/22/2016, 10/30/2017   • Influenza TIV (IM) 09/27/2013, 10/21/2014, 08/29/2015   • Pneumococcal Conjugate 13-Valent (PCV13) 06/13/2016   • Pneumococcal Polysaccharide (PPSV23) 06/21/2017     She has seen Dr. Yusuf and now Natalee is having second thoughts about wanting to proceed with the scope.  She had been quite adament for months that she wanted a c-scope.  Now she isn't sure.  She is still having issues with fecal incont.  This has gotten worse.  He told her to take metamucil.  She did this one morning.  It was too thick to swallow the next morning.    She still has itching from head to toe.      Review of Systems   Constitution: Negative for chills, fever and malaise/fatigue.   HENT: Positive for hearing loss. Negative for hoarse voice and sore throat.    Eyes: Negative for blurred vision, double vision and visual disturbance.   Cardiovascular: Positive for leg swelling (some). Negative for chest pain and palpitations.   Respiratory: Negative for cough and shortness of breath.    Endocrine: Negative for polydipsia and polyuria.   Hematologic/Lymphatic: Does not bruise/bleed easily.   Skin: Positive for itching. Negative for rash and suspicious lesions.   Musculoskeletal: Positive for arthritis. Negative for myalgias.   Gastrointestinal: Positive for bowel incontinence and nausea (occ). Negative for  bloating, abdominal pain, change in bowel habit, constipation, diarrhea, dysphagia, hematochezia, melena and vomiting.   Genitourinary: Negative for dysuria and hematuria.   Neurological: Negative for dizziness, headaches and light-headedness.   Psychiatric/Behavioral: Negative for depression. The patient is not nervous/anxious.        Past Medical History:   Diagnosis Date   • Carotid artery stenosis    • Colon polyp 05/17/2007    tubular adenoma, 60 cm   • Colon polyp 11/11/2003    tubular adenoma, cecum   • DDD (degenerative disc disease), lumbar    • Diabetes mellitus (CMS/HCC)    • Disease of thyroid gland    • Hyperlipidemia    • Hypertension    • Hypokalemia    • Irregular heart beat    • Osteopenia    • PAF (paroxysmal atrial fibrillation) (CMS/HCC)    • Pruritus    • Sciatica    • TIA (transient ischemic attack)        Past Surgical History:   Procedure Laterality Date   • CATARACT EXTRACTION Bilateral     Dr. Vivar   • CHOLECYSTECTOMY N/A 05/09/2001    Dr. Adam Peterson   • COLONOSCOPY W/ POLYPECTOMY N/A 11/11/2003    Small cecal polyp-Dr. Adam Peterson   • COLONOSCOPY W/ POLYPECTOMY N/A 05/17/2007    Non-thrombosed external hemorrhoids, diverticulosis, one 7 mm, non-bleeding polyp at 60 cm proximal to the anus-Dr. Adam Peterson   • FOOT SURGERY     • HEMORRHOIDECTOMY N/A 02/09/2004    Dr. Carlos Hyman   • HYSTERECTOMY  1980   • NEPHRECTOMY Right 2000   • NEUROMA SURGERY Left     excision of acoustic neuroma   • THYROIDECTOMY, PARTIAL  1975   • WRIST SURGERY         Social History     Social History   • Marital status:      Spouse name: N/A   • Number of children: N/A   • Years of education: N/A     Occupational History   • Not on file.     Social History Main Topics   • Smoking status: Former Smoker   • Smokeless tobacco: Not on file   • Alcohol use No   • Drug use: No   • Sexual activity: Defer     Other Topics Concern   • Not on file     Social History Narrative   • No narrative on  file       Family History   Problem Relation Age of Onset   • Stroke Brother    • Breast cancer Other    • Heart disease Other    • Breast cancer Mother    • Mitral valve prolapse Neg Hx        Allergies   Allergen Reactions   • Amlodipine Rash   • Codeine Rash   • Penicillins Rash       Current Outpatient Prescriptions on File Prior to Visit   Medication Sig Dispense Refill   • ACCU-CHEK FASTCLIX LANCETS misc USE 1 DAILY 102 each 2   • ACCU-CHEK SMARTVIEW test strip TEST EVERY  each 3   • acetaminophen (TYLENOL) 500 MG tablet Take 500 mg by mouth every 6 (six) hours as needed for mild pain (1-3).     • Ascorbic Acid (VITAMIN C) 500 MG capsule Take 1 tablet by mouth Daily.     • Blood Glucose Calibration (ACCU-CHEK SMARTVIEW CONTROL VI) Accu-Chek SmartView In Vitro Strip; Patient Sig: Accu-Chek SmartView In Vitro Strip USE 1 TEST STRIP EVERY DAY; 100; 3; 19-Aug-2013; Active     • Cholecalciferol (VITAMIN D PO) Take 1 tablet by mouth Daily.     • glucose blood (ACCU-CHEK SMARTVIEW) test strip Use as instructed. 100 each 3   • glucose blood (ACCU-CHEK SMARTVIEW) test strip Use 1 Test strip every day to test blood glucose. 100 each 3   • Lancets Misc. (ACCU-CHEK FASTCLIX LANCET) kit      • lisinopril (PRINIVIL,ZESTRIL) 40 MG tablet take 1 tablet by mouth once daily 90 tablet 3   • ondansetron (ZOFRAN) 4 MG tablet Take 1 tablet by mouth Every 8 (Eight) Hours As Needed for Nausea or Vomiting. 15 tablet 0   • SYNTHROID 25 MCG tablet take 1 tablet by mouth every morning 30 tablet 6   • travoprost, BAK free, (TRAVATAN) 0.004 % solution ophthalmic solution Apply  to eye.     • [DISCONTINUED] metoprolol succinate XL (TOPROL-XL) 50 MG 24 hr tablet Take 2 tablets by mouth daily. 90 tablet 3     No current facility-administered medications on file prior to visit.        Patient Active Problem List   Diagnosis   • Stenosis of carotid artery   • Degeneration of intervertebral disc of lumbar region   • Diabetes mellitus  "(CMS/HCC)   • Hyperlipidemia   • Hypertension   • Hypothyroidism   • Paroxysmal atrial fibrillation (CMS/HCC)   • Temporary cerebral vascular dysfunction   • Pruritic rash   • Urinary hesitancy   • Difficulty urinating   • Diabetic peripheral neuropathy (CMS/HCC)   • Deformity of both feet   • Abnormal weight loss   • Elevated alkaline phosphatase level   • Pancreatic cyst   • Bowel habit changes   • Hx of adenomatous colonic polyps   • Fecal incontinence       Health Risk Assessment/Depression Screen/Functional and Cognitive Screening:   The patient has completed a Health Risk Assessment & Depression screen. These have been reviewed with them and have been scanned as a separate documents.    Age-appropriate Screening Schedule:  Refer to the list below for future screening recommendations based on patient's age. Orders for these recommended tests are listed in the plan section. The patient has been provided with a written plan.     I have reviewed their problem list, past medical history, family history, social history, and surgical history.     Vitals:    08/02/18 1303   BP: 132/76   Pulse: 77   Resp: 18   SpO2: 99%   Weight: 47.2 kg (104 lb)   Height: 144.8 cm (57.01\")       Body mass index is 22.5 kg/m².    Physical Exam   Constitutional: She is oriented to person, place, and time. She appears well-developed and well-nourished. No distress.   HENT:   Head: Normocephalic and atraumatic.   Nose: Nose normal.   Mouth/Throat: Oropharynx is clear and moist.   Eyes: Conjunctivae are normal. No scleral icterus.   Neck: Normal range of motion. Neck supple. No thyromegaly present.   Cardiovascular: Normal rate, regular rhythm and normal heart sounds.  Exam reveals no gallop and no friction rub.    No murmur heard.  Pulses:       Carotid pulses are 2+ on the right side, and 2+ on the left side.       Femoral pulses are 2+ on the right side, and 2+ on the left side.       Dorsalis pedis pulses are 2+ on the right side, and " 2+ on the left side.        Posterior tibial pulses are 2+ on the right side, and 2+ on the left side.   Pulmonary/Chest: Effort normal and breath sounds normal. No respiratory distress. She has no wheezes. She has no rales. Right breast exhibits no mass and no nipple discharge. Left breast exhibits no mass and no nipple discharge.   Abdominal: Soft. Bowel sounds are normal. She exhibits no distension and no mass. There is no tenderness.   Musculoskeletal: Normal range of motion. She exhibits no edema.     Vascular Status -  Her right foot exhibits no edema. Her left foot exhibits no edema.  Skin Integrity  -  Her right foot skin is intact.Her left foot skin is intact..  Lymphadenopathy:     She has no cervical adenopathy.     She has no axillary adenopathy.        Right: No inguinal and no supraclavicular adenopathy present.        Left: No inguinal and no supraclavicular adenopathy present.   Neurological: She is alert and oriented to person, place, and time. She has normal reflexes.   Skin: Skin is warm and dry.   Psychiatric: She has a normal mood and affect. Her speech is normal and behavior is normal. Judgment and thought content normal. Cognition and memory are normal.   Vitals reviewed.      Results for orders placed or performed in visit on 07/27/18   Comprehensive Metabolic Panel   Result Value Ref Range    Glucose 91 65 - 99 mg/dL    BUN 19 8 - 23 mg/dL    Creatinine 0.74 0.57 - 1.00 mg/dL    eGFR Non African Am 74 >60 mL/min/1.73    eGFR African Am 90 >60 mL/min/1.73    BUN/Creatinine Ratio 25.7 (H) 7.0 - 25.0    Sodium 143 136 - 145 mmol/L    Potassium 4.1 3.5 - 5.2 mmol/L    Chloride 102 98 - 107 mmol/L    Total CO2 28.2 22.0 - 29.0 mmol/L    Calcium 10.1 8.6 - 10.5 mg/dL    Total Protein 7.0 6.0 - 8.5 g/dL    Albumin 4.60 3.50 - 5.20 g/dL    Globulin 2.4 gm/dL    A/G Ratio 1.9 g/dL    Total Bilirubin 1.0 0.1 - 1.2 mg/dL    Alkaline Phosphatase 94 39 - 117 U/L    AST (SGOT) 20 1 - 32 U/L    ALT (SGPT)  15 1 - 33 U/L   Lipid Panel   Result Value Ref Range    Total Cholesterol 198 0 - 200 mg/dL    Triglycerides 60 0 - 150 mg/dL    HDL Cholesterol 70 (H) 40 - 60 mg/dL    VLDL Cholesterol 12 5 - 40 mg/dL    LDL Cholesterol  116 (H) 0 - 100 mg/dL   TSH Rfx On Abnormal To Free T4   Result Value Ref Range    TSH 0.911 0.27 - 4.2 mIU/mL   Hemoglobin A1c   Result Value Ref Range    Hemoglobin A1C 6.45 (H) 4.80 - 5.60 %   Urinalysis With Culture If Indicated - Urine, Clean Catch   Result Value Ref Range    Specific Gravity, UA 1.014 1.005 - 1.030    pH, UA 7.5 5.0 - 7.5    Color, UA Yellow Yellow    Appearance, UA Clear Clear    Leukocytes, UA Negative Negative    Protein Trace Negative/Trace    Glucose, UA Negative Negative    Ketones Negative Negative    Blood, UA Negative Negative    Bilirubin, UA Negative Negative    Urobilinogen, UA 1.0 0.2 - 1.0 mg/dL    Nitrite, UA Negative Negative    Microscopic Examination Comment     MICROSCOPIC EXAMINATION See below:     Urinalysis Reflex Comment    Microalbumin / Creatinine Urine Ratio - Urine, Clean Catch   Result Value Ref Range    Creatinine, Urine 28.1 Not Estab. mg/dL    Microalbumin, Urine 79.9 Not Estab. ug/mL    Microalbumin/Creatinine Ratio 284.3 (H) 0.0 - 30.0 mg/g creat   Microscopic Examination   Result Value Ref Range    WBC, UA 0-5 0 - 5 /hpf    RBC, UA 0-2 0 - 2 /hpf    Epithelial Cells (non renal) 0-10 0 - 10 /hpf    Bacteria, UA Few None seen/Few /hpf   CBC & Differential   Result Value Ref Range    WBC 5.97 4.50 - 10.70 10*3/mm3    RBC 4.97 3.90 - 5.20 10*6/mm3    Hemoglobin 14.7 11.9 - 15.5 g/dL    Hematocrit 45.0 35.6 - 45.5 %    MCV 90.5 80.5 - 98.2 fL    MCH 29.6 26.9 - 32.0 pg    MCHC 32.7 32.4 - 36.3 g/dL    RDW 14.3 (H) 11.7 - 13.0 %    Platelets 214 140 - 500 10*3/mm3    Neutrophil Rel % 55.6 42.7 - 76.0 %    Lymphocyte Rel % 29.3 19.6 - 45.3 %    Monocyte Rel % 8.2 5.0 - 12.0 %    Eosinophil Rel % 4.7 0.3 - 6.2 %    Basophil Rel % 2.2 (H) 0.0 - 1.5 %     Neutrophils Absolute 3.32 1.90 - 8.10 10*3/mm3    Lymphocytes Absolute 1.75 0.90 - 4.80 10*3/mm3    Monocytes Absolute 0.49 0.20 - 1.20 10*3/mm3    Eosinophils Absolute 0.28 0.00 - 0.70 10*3/mm3    Basophils Absolute 0.13 0.00 - 0.20 10*3/mm3    Immature Granulocyte Rel % 0.2 0.0 - 0.5 %    Immature Grans Absolute 0.01 0.00 - 0.03 10*3/mm3       Assessment & Plan:    Diagnoses and all orders for this visit:    Health maintenance examination    Medicare annual wellness visit, subsequent    Encounter for screening mammogram for breast cancer  -     Mammo Screening Bilateral With CAD; Future    Pancreatic cyst    Type 2 diabetes mellitus with microalbuminuria, without long-term current use of insulin (CMS/[facility])    Acquired hypothyroidism    Bowel habit changes    Incontinence of feces, unspecified fecal incontinence type    Essential hypertension    Mixed hyperlipidemia        Discussion/Summary  Natalee is here fpr AWV and CPE.  She is up to date on health maintenance.  She wants to continue having mammograms.  Order placed.  Her A1c is very well controlled.  Her bp is controlled. Labs all look very good, especially for 88.  We had a very extensive discussion with her son present about whether to proceed with the scope.  She is really having second thoughts.  She is due to have a repeat CT abd/pelvis at the end of August for her pancreatic cyst.  If this is stable and her GI tract is unremarkable/unchanged over the past 6 months, we discussed tabling the scope.  She had been very adamant about having the scope done but now she is second guessing this.  Advised to continue all current meds.        Start ASA 81 mg daily     Follow Up:  Return in about 6 months (around 2/2/2019) for Next scheduled follow up, with nonfasting labs prior.     An After Visit Summary and PPPS with all of these plans were given to the patient.

## 2018-08-02 NOTE — PATIENT INSTRUCTIONS
Medicare Wellness  Personal Prevention Plan of Service     Date of Office Visit:  2018  Encounter Provider:  Lashonda Prieto MD  Place of Service:  [facility] INTER[facility]  Patient Name: Natalee Klein  :  1930    As part of the Medicare Wellness portion of your visit today, we are providing you with this personalized preventive plan of services (PPPS). This plan is based upon recommendations of the [place] Preventive Services Task Force (USPSTF) and the Advisory Committee on Immunization Practices (ACIP).    This lists the preventive care services that should be considered, and provides dates of when you are due. Items listed as completed are up-to-date and do not require any further intervention.    Health Maintenance   Topic Date Due   • ZOSTER VACCINE (2 of 2) 2015   • DXA SCAN  2016   • MAMMOGRAM  06/15/2017   • MEDICARE ANNUAL WELLNESS  2018   • INFLUENZA VACCINE  2018   • HEMOGLOBIN A1C  2019   • LIPID PANEL  2019   • URINE MICROALBUMIN  2019   • TDAP/TD VACCINES (2 - Td) 2026   • PNEUMOCOCCAL VACCINES (65+ LOW/MEDIUM RISK)  Completed       No orders of the defined types were placed in this encounter.      No Follow-up on file.    Start Aspirin 81 mg once a day after your scope.

## 2018-08-16 ENCOUNTER — HOSPITAL ENCOUNTER (OUTPATIENT)
Dept: CT IMAGING | Facility: HOSPITAL | Age: 83
Discharge: HOME | End: 2018-08-16
Admitting: INTERNAL MEDICINE

## 2018-08-16 ENCOUNTER — HOSPITAL ENCOUNTER (OUTPATIENT)
Dept: MAMMOGRAPHY | Facility: HOSPITAL | Age: 83
Discharge: HOME | End: 2018-08-16

## 2018-08-16 DIAGNOSIS — Z12.31 ENCOUNTER FOR SCREENING MAMMOGRAM FOR BREAST CANCER: ICD-10-CM

## 2018-08-16 DIAGNOSIS — K86.2 PANCREATIC CYST: ICD-10-CM

## 2018-08-16 LAB — CREAT BLDA-MCNC: 0.7 MG/DL (ref 0.6–1.3)

## 2018-08-16 PROCEDURE — 25010000002 IOPAMIDOL 61 % SOLUTION: Performed by: EMERGENCY MEDICINE

## 2018-08-16 PROCEDURE — 77067 SCR MAMMO BI INCL CAD: CPT

## 2018-08-16 PROCEDURE — 0 DIATRIZOATE MEGLUMINE & SODIUM PER 1 ML: Performed by: EMERGENCY MEDICINE

## 2018-08-16 PROCEDURE — 74177 CT ABD & PELVIS W/CONTRAST: CPT

## 2018-08-16 PROCEDURE — 82565 ASSAY OF CREATININE: CPT

## 2018-08-16 RX ADMIN — IOPAMIDOL 85 ML: 612 INJECTION, SOLUTION INTRAVENOUS at 10:55

## 2018-08-16 RX ADMIN — DIATRIZOATE MEGLUMINE AND DIATRIZOATE SODIUM 30 ML: 660; 100 LIQUID ORAL; RECTAL at 10:55

## 2018-08-20 ENCOUNTER — TELEPHONE (OUTPATIENT)
Dept: INTERNAL MEDICINE | Facility: CLINIC | Age: 83
End: 2018-08-20

## 2018-08-20 NOTE — TELEPHONE ENCOUNTER
----- Message from Lashonda Prieto MD sent at 8/20/2018  1:06 PM EDT -----  Could you call patient and schedule her an appt to go over the CT results.  There are too many things to discuss over the phone.  I would like her son to come with her so we can decide how much additional testing she wants to do.

## 2018-08-20 NOTE — TELEPHONE ENCOUNTER
Pt and her son informed, via voicemail.   Went ahead and scheduled an apt on the 28th at 2:30pm for 45 minutes.

## 2018-08-28 ENCOUNTER — OFFICE VISIT (OUTPATIENT)
Dept: INTERNAL MEDICINE | Facility: CLINIC | Age: 83
End: 2018-08-28

## 2018-08-28 VITALS
BODY MASS INDEX: 22.44 KG/M2 | SYSTOLIC BLOOD PRESSURE: 152 MMHG | DIASTOLIC BLOOD PRESSURE: 82 MMHG | OXYGEN SATURATION: 98 % | HEART RATE: 76 BPM | HEIGHT: 57 IN | WEIGHT: 104 LBS | RESPIRATION RATE: 18 BRPM

## 2018-08-28 DIAGNOSIS — N32.89 BLADDER WALL THICKENING: Primary | ICD-10-CM

## 2018-08-28 DIAGNOSIS — N83.209 CYST OF OVARY, UNSPECIFIED LATERALITY: ICD-10-CM

## 2018-08-28 DIAGNOSIS — K86.2 PANCREATIC CYST: ICD-10-CM

## 2018-08-28 PROCEDURE — 99214 OFFICE O/P EST MOD 30 MIN: CPT | Performed by: INTERNAL MEDICINE

## 2018-08-28 RX ORDER — BETAMETHASONE DIPROPIONATE 0.5 MG/G
OINTMENT TOPICAL
Refills: 0 | COMMUNITY
Start: 2018-08-10 | End: 2019-06-27

## 2018-08-28 NOTE — PATIENT INSTRUCTIONS
Add Benefiber or metamucil every morning.    Use Senakot as needed for constipation  Eat regular meals, minimize dairy

## 2018-08-28 NOTE — PROGRESS NOTES
Chief Complaint  Natalee Klein is a 88 y.o. female who presents for Results; Abnormal Imaging; and Follow-up  .    History of Present Illness   Natalee is here for follow up on the CT abd and pelvis she had.  She is here with her son.  We had a very detailed discussion about her CT findings.  We discussed that she has pancreatic cysts that are indeterminant as to the cause.  Given that she is not having any symptoms of abd pain while eating or abd pain in general, we discussed not doing anything other than repeating a CT in a few months.  Her son is agreeable to this.  Natalee is very hard of hearing and despite very loud yelling, I am not convinced she understands all that we are saying.  We also discussed that there is thickening of the bladder wall.  She is not having any urinary symptoms other than frequency.  She had a negative u/s a month ago.  We discussed not referring to Uro at this point.  Her son does not feel she would tolerate a cysto very well and is in agreement to just reassess on the next CT.  The third issue on her CT is a 2 cm ovarian cyst that they recommended u/s imaging for.  He would like to proceed with imaging of this.   Natalee is in agreement.   She started taking a baby asa nightly last week and now has bruising on her arms.      Review of Systems   Constitution: Positive for decreased appetite. Negative for weakness, malaise/fatigue, weight gain and weight loss.   Cardiovascular: Negative for chest pain.   Respiratory: Negative for shortness of breath.    Hematologic/Lymphatic: Bruises/bleeds easily.   Skin: Positive for itching. Negative for rash.   Gastrointestinal: Positive for change in bowel habit (she doesn't go to the bathroom much but acc to her son, she really hardly eats anything.  ). Negative for abdominal pain, heartburn, hematochezia and melena.   Genitourinary: Positive for frequency. Negative for dysuria, hematuria, pelvic pain and urgency.       Patient Active Problem List    Diagnosis   • Stenosis of carotid artery   • Degeneration of intervertebral disc of lumbar region   • Diabetes mellitus (CMS/HCC)   • Hyperlipidemia   • Hypertension   • Hypothyroidism   • Paroxysmal atrial fibrillation (CMS/HCC)   • Temporary cerebral vascular dysfunction   • Pruritic rash   • Urinary hesitancy   • Difficulty urinating   • Diabetic peripheral neuropathy (CMS/HCC)   • Deformity of both feet   • Abnormal weight loss   • Elevated alkaline phosphatase level   • Pancreatic cyst   • Bowel habit changes   • Hx of adenomatous colonic polyps   • Fecal incontinence       Past Medical History:   Diagnosis Date   • Carotid artery stenosis    • Colon polyp 05/17/2007    tubular adenoma, 60 cm   • Colon polyp 11/11/2003    tubular adenoma, cecum   • DDD (degenerative disc disease), lumbar    • Diabetes mellitus (CMS/HCC)    • Disease of thyroid gland    • Hyperlipidemia    • Hypertension    • Hypokalemia    • Irregular heart beat    • Osteopenia    • PAF (paroxysmal atrial fibrillation) (CMS/HCC)    • Pruritus    • Sciatica    • TIA (transient ischemic attack)        Past Surgical History:   Procedure Laterality Date   • CATARACT EXTRACTION Bilateral     Dr. Vivar   • CHOLECYSTECTOMY N/A 05/09/2001    Dr. Adam Peterson   • COLONOSCOPY W/ POLYPECTOMY N/A 11/11/2003    Small cecal polyp-Dr. Adam Peterson   • COLONOSCOPY W/ POLYPECTOMY N/A 05/17/2007    Non-thrombosed external hemorrhoids, diverticulosis, one 7 mm, non-bleeding polyp at 60 cm proximal to the anus-Dr. Adam Peterson   • FOOT SURGERY     • HEMORRHOIDECTOMY N/A 02/09/2004    Dr. Carlos Hyman   • HYSTERECTOMY  1980   • NEPHRECTOMY Right 2000   • NEUROMA SURGERY Left     excision of acoustic neuroma   • THYROIDECTOMY, PARTIAL  1975   • WRIST SURGERY         Family History   Problem Relation Age of Onset   • Stroke Brother    • Breast cancer Other    • Heart disease Other    • Breast cancer Mother    • Mitral valve prolapse Neg Hx         Social History     Social History   • Marital status:      Spouse name: N/A   • Number of children: N/A   • Years of education: N/A     Occupational History   • Not on file.     Social History Main Topics   • Smoking status: Former Smoker   • Smokeless tobacco: Not on file   • Alcohol use No   • Drug use: No   • Sexual activity: Defer     Other Topics Concern   • Not on file     Social History Narrative   • No narrative on file       Current Outpatient Prescriptions on File Prior to Visit   Medication Sig Dispense Refill   • ACCU-CHEK FASTCLIX LANCETS misc USE 1 DAILY 102 each 2   • ACCU-CHEK SMARTVIEW test strip TEST EVERY  each 3   • acetaminophen (TYLENOL) 500 MG tablet Take 500 mg by mouth every 6 (six) hours as needed for mild pain (1-3).     • Ascorbic Acid (VITAMIN C) 500 MG capsule Take 1 tablet by mouth Daily.     • Blood Glucose Calibration (ACCU-CHEK SMARTVIEW CONTROL VI) Accu-Chek SmartView In Vitro Strip; Patient Sig: Accu-Chek SmartView In Vitro Strip USE 1 TEST STRIP EVERY DAY; 100; 3; 19-Aug-2013; Active     • Cholecalciferol (VITAMIN D PO) Take 1 tablet by mouth Daily.     • glucose blood (ACCU-CHEK SMARTVIEW) test strip Use as instructed. 100 each 3   • glucose blood (ACCU-CHEK SMARTVIEW) test strip Use 1 Test strip every day to test blood glucose. 100 each 3   • Lancets Misc. (ACCU-CHEK FASTCLIX LANCET) kit      • lisinopril (PRINIVIL,ZESTRIL) 40 MG tablet take 1 tablet by mouth once daily 90 tablet 3   • ondansetron (ZOFRAN) 4 MG tablet Take 1 tablet by mouth Every 8 (Eight) Hours As Needed for Nausea or Vomiting. 15 tablet 0   • SYNTHROID 25 MCG tablet take 1 tablet by mouth every morning 30 tablet 6   • travoprost, BAK free, (TRAVATAN) 0.004 % solution ophthalmic solution Apply  to eye.       No current facility-administered medications on file prior to visit.        Allergies   Allergen Reactions   • Amlodipine Rash   • Codeine Rash   • Penicillins Rash       Vitals:     "08/28/18 0958   BP: 152/82   Pulse: 76   Resp: 18   SpO2: 98%   Weight: 47.2 kg (104 lb)   Height: 144.8 cm (57.01\")       Body mass index is 22.5 kg/m².    Objective   Physical Exam   Constitutional: She appears well-developed and well-nourished. No distress.   HENT:   Right Ear: Decreased hearing is noted.   Left Ear: Decreased hearing is noted.   Eyes: Conjunctivae are normal. No scleral icterus.   Cardiovascular: Normal rate and regular rhythm.    No murmur heard.  Pulmonary/Chest: Effort normal and breath sounds normal. No respiratory distress. She has no wheezes. She has no rales.   Abdominal: Soft. Bowel sounds are normal. She exhibits no distension and no mass. There is no tenderness. There is no guarding.   Neurological: She is alert.   Psychiatric: She has a normal mood and affect. Her speech is normal and behavior is normal. Thought content normal. Cognition and memory are impaired.       Results for orders placed or performed during the hospital encounter of 08/16/18   POC Creatinine   Result Value Ref Range    Creatinine 0.70 0.60 - 1.30 mg/dL       Assessment/Plan   Diagnoses and all orders for this visit:    Bladder wall thickening    Cyst of ovary, unspecified laterality  -     US Non-ob Transvaginal; Future  -     US Pelvis Complete; Future  -     US testicular or ovarian vascular limited; Future    Pancreatic cyst    Other orders  -     betamethasone, augmented, (DIPROLENE) 0.05 % ointment; APPLY TO SKIN ONCE DAILY APPLY TO ARMS AND LEGS TWICE DAILY        Discussion/Summary  Natalee is here to discuss her CT findings as noted above.  We spent 25/30 minutes going over the details of the results and whether to pursue additional imaging/work up or not.  We are not going to do anything immediately about the pancreas. She has no abd pain.  She would not be a surgical candidate if this were a pancreatic cancer.  We discussed doing the most amount of good with the least amount of harm, prolonging life, not " prolonging death.  We decided not to pursue any further bladder work up given her normal u/a results several weeks back.  I have asked her to stop the baby asa as she has extensive bruising on her forearms from this.  I have encouraged her to drink an ensure or boost a day with benefiber to keep her more regular.   She, as many older women do, is really fixating on her bowel habits.   She is looking to move into an assisted living facility which I think would be great for her.  She would have more social life and more consistent meals.  She seems to be looking forward to this.    No Follow-up on file.

## 2018-08-30 ENCOUNTER — OFFICE VISIT (OUTPATIENT)
Dept: RETAIL CLINIC | Facility: CLINIC | Age: 83
End: 2018-08-30

## 2018-08-30 VITALS
SYSTOLIC BLOOD PRESSURE: 122 MMHG | OXYGEN SATURATION: 99 % | DIASTOLIC BLOOD PRESSURE: 80 MMHG | RESPIRATION RATE: 18 BRPM | TEMPERATURE: 97.7 F | HEART RATE: 67 BPM

## 2018-08-30 DIAGNOSIS — H61.23 BILATERAL IMPACTED CERUMEN: Primary | ICD-10-CM

## 2018-08-30 PROCEDURE — 69210 REMOVE IMPACTED EAR WAX UNI: CPT | Performed by: NURSE PRACTITIONER

## 2018-08-30 PROCEDURE — 99212 OFFICE O/P EST SF 10 MIN: CPT | Performed by: NURSE PRACTITIONER

## 2018-09-04 ENCOUNTER — HOSPITAL ENCOUNTER (OUTPATIENT)
Dept: ULTRASOUND IMAGING | Facility: HOSPITAL | Age: 83
Discharge: HOME | End: 2018-09-04
Admitting: INTERNAL MEDICINE

## 2018-09-04 DIAGNOSIS — N83.209 CYST OF OVARY, UNSPECIFIED LATERALITY: ICD-10-CM

## 2018-09-04 PROCEDURE — 76857 US EXAM PELVIC LIMITED: CPT

## 2018-09-04 PROCEDURE — 76856 US EXAM PELVIC COMPLETE: CPT

## 2018-09-04 PROCEDURE — 76830 TRANSVAGINAL US NON-OB: CPT

## 2018-09-06 ENCOUNTER — TELEPHONE (OUTPATIENT)
Dept: INTERNAL MEDICINE | Facility: CLINIC | Age: 83
End: 2018-09-06

## 2018-09-06 NOTE — TELEPHONE ENCOUNTER
----- Message from Lashonda Prieto MD sent at 9/6/2018  9:38 AM EDT -----  Please call - the cyst on her ovary looks benign.  No further work up is needed.  All good news.

## 2018-09-13 NOTE — TELEPHONE ENCOUNTER
Pt called asking for results.   Informed via voicemail, told to call back if she has any questions.

## 2018-09-21 ENCOUNTER — TELEPHONE (OUTPATIENT)
Dept: SURGERY | Facility: CLINIC | Age: 83
End: 2018-09-21

## 2018-09-21 NOTE — TELEPHONE ENCOUNTER
Patient called the office today stating that she has continued to have constipation since she was seen by our office in July 2018. Has tried the recommended Metamucil, but it has not helped. Advised the patient to try OTC stool softeners with an increase in her fluid intake. Patient would like to know if you recommend anything else to help?

## 2018-10-04 ENCOUNTER — TELEPHONE (OUTPATIENT)
Dept: INTERNAL MEDICINE | Facility: CLINIC | Age: 83
End: 2018-10-04

## 2018-10-04 NOTE — TELEPHONE ENCOUNTER
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 Wants to know what to do after this, keep using her medicine? \"It isn't very much fun\"  "

## 2018-10-12 NOTE — TELEPHONE ENCOUNTER
Pt stated that she was concerned with getting the cream for the itching in her eye.   Told her to see if there are any gloves that she can get that she isn't allergic to that she can use. Pt has not been using the cream as much as she should since she is scared she isn't washing her hands well enough and has an eye medication she has to administer to herself.     Informed that is she couldn't find gloves that she could use to see if the dermatologist could give her an alternative option that might be oral so she isn't having to apply cream and wash off her hands.     Pt will update us on what they inform her to do.

## 2018-10-16 RX ORDER — LEVOTHYROXINE SODIUM 25 MCG
TABLET ORAL
Qty: 30 TABLET | Refills: 3 | Status: SHIPPED | OUTPATIENT
Start: 2018-10-16 | End: 2019-02-19

## 2018-11-29 ENCOUNTER — TELEPHONE (OUTPATIENT)
Dept: INTERNAL MEDICINE | Facility: CLINIC | Age: 83
End: 2018-11-29

## 2018-11-29 NOTE — TELEPHONE ENCOUNTER
Pt called and stated that she is now living at a place for elderly people in [facility]. Has lived there for about a week.     Wanted to know if you have any solution to the bm..  (Message was cut off, tried to return the call, no answer. Awaiting return call)

## 2018-12-04 ENCOUNTER — TELEPHONE (OUTPATIENT)
Dept: INTERNAL MEDICINE | Facility: CLINIC | Age: 83
End: 2018-12-04

## 2018-12-04 NOTE — TELEPHONE ENCOUNTER
"Pt called and stated that she has been incontinent, not diarrhea, but spoke with  and he told her to mention a medication \"linzess\" and see if you'd prescribe this.   "

## 2018-12-05 NOTE — TELEPHONE ENCOUNTER
Pt informed, via voicemail.    That linzess was suggested when she was having diarrhea and they told her to try stool softeners and miralax. Informed to quit taking those if she is and that linzess would make this situation worse.

## 2018-12-06 NOTE — TELEPHONE ENCOUNTER
Pt called and stated that she is not taking anything for constipation and that her last bm wasn't since Nov 21/22 and another one a couple days later after that (27th roughly) and that's it.   Pt stated that she has not had a good bm in her underwear or at all since then.

## 2018-12-06 NOTE — TELEPHONE ENCOUNTER
Please schedule an office visit for her with a family member present.  The conflicting information with each phone call is just too much.

## 2018-12-06 NOTE — TELEPHONE ENCOUNTER
Can somebody call the pt's son Gordy at [phone] and schedule this pt a 30 minute apt to discuss her bm's, telephone calls are not working. A family member needs to be present with her.     Please and thank you!

## 2018-12-07 NOTE — TELEPHONE ENCOUNTER
Appointment made and patient aware of appointment date and time. Gordy patient son stated that he would be present.

## 2018-12-10 ENCOUNTER — OFFICE VISIT (OUTPATIENT)
Dept: INTERNAL MEDICINE | Facility: CLINIC | Age: 83
End: 2018-12-10

## 2018-12-10 VITALS
BODY MASS INDEX: 22.87 KG/M2 | OXYGEN SATURATION: 98 % | WEIGHT: 106 LBS | HEART RATE: 76 BPM | DIASTOLIC BLOOD PRESSURE: 76 MMHG | HEIGHT: 57 IN | RESPIRATION RATE: 18 BRPM | SYSTOLIC BLOOD PRESSURE: 134 MMHG

## 2018-12-10 DIAGNOSIS — E03.9 ACQUIRED HYPOTHYROIDISM: ICD-10-CM

## 2018-12-10 DIAGNOSIS — M54.2 NECK PAIN ON RIGHT SIDE: ICD-10-CM

## 2018-12-10 DIAGNOSIS — R15.9 FULL INCONTINENCE OF FECES: Primary | ICD-10-CM

## 2018-12-10 DIAGNOSIS — R80.9 TYPE 2 DIABETES MELLITUS WITH MICROALBUMINURIA, WITHOUT LONG-TERM CURRENT USE OF INSULIN (HCC): Primary | ICD-10-CM

## 2018-12-10 DIAGNOSIS — R19.8 CHANGE IN BOWEL MOVEMENT: ICD-10-CM

## 2018-12-10 DIAGNOSIS — E11.29 TYPE 2 DIABETES MELLITUS WITH MICROALBUMINURIA, WITHOUT LONG-TERM CURRENT USE OF INSULIN (HCC): Primary | ICD-10-CM

## 2018-12-10 PROCEDURE — 99214 OFFICE O/P EST MOD 30 MIN: CPT | Performed by: INTERNAL MEDICINE

## 2018-12-10 NOTE — PROGRESS NOTES
Chief Complaint  Natalee Klein is a 88 y.o. female who presents for Fecal Incontinence (Pt has bm's in her underwear, without the sensation she needs to go. ) and Constipation (Stated she had not a bm since Nov 22nd, son stated that she has had one within the past couple of days. )  .    History of Present Illness   Natalee is here with her son for ongoing bowel issues.  She has been at an assisted living facility now for two weeks.  Natalee is saying she does not have diarrhea or constipation.  She is having bowel movements without any warning.  This morning she was planning to go down to breakfast and she suddenly had a bowel movement in her briefs.  She had another bm a few days ago.      She has seen several ENTs about her right ear.  She has pain in her ear that she says gets up to 20/10.  She puts heat on it and it gets better.  The pain goes down into her neck.  No one can fix her problem.      Review of Systems   Constitution: Negative for weight gain and weight loss.   HENT: Positive for ear pain and hearing loss.    Musculoskeletal: Positive for neck pain and stiffness.   Gastrointestinal: Positive for change in bowel habit. Negative for abdominal pain, constipation, diarrhea, hematochezia and vomiting.       Patient Active Problem List   Diagnosis   • Stenosis of carotid artery   • Degeneration of intervertebral disc of lumbar region   • Diabetes mellitus (CMS/HCC)   • Hyperlipidemia   • Hypertension   • Hypothyroidism   • Paroxysmal atrial fibrillation (CMS/HCC)   • Temporary cerebral vascular dysfunction   • Pruritic rash   • Urinary hesitancy   • Difficulty urinating   • Diabetic peripheral neuropathy (CMS/HCC)   • Deformity of both feet   • Abnormal weight loss   • Elevated alkaline phosphatase level   • Pancreatic cyst   • Bowel habit changes   • Hx of adenomatous colonic polyps   • Fecal incontinence       Past Medical History:   Diagnosis Date   • Carotid artery stenosis    • Colon polyp 05/17/2007     tubular adenoma, 60 cm   • Colon polyp 11/11/2003    tubular adenoma, cecum   • DDD (degenerative disc disease), lumbar    • Diabetes mellitus (CMS/HCC)    • Disease of thyroid gland    • Hyperlipidemia    • Hypertension    • Hypokalemia    • Irregular heart beat    • Osteopenia    • PAF (paroxysmal atrial fibrillation) (CMS/HCC)    • Pruritus    • Sciatica    • TIA (transient ischemic attack)        Past Surgical History:   Procedure Laterality Date   • CATARACT EXTRACTION Bilateral     Dr. Vivar   • CHOLECYSTECTOMY N/A 05/09/2001    Dr. Adam Peterson   • COLONOSCOPY W/ POLYPECTOMY N/A 11/11/2003    Small cecal polyp-Dr. Adam Peterson   • COLONOSCOPY W/ POLYPECTOMY N/A 05/17/2007    Non-thrombosed external hemorrhoids, diverticulosis, one 7 mm, non-bleeding polyp at 60 cm proximal to the anus-Dr. Adam Peterson   • FOOT SURGERY     • HEMORRHOIDECTOMY N/A 02/09/2004    Dr. Carlos Hyman   • HYSTERECTOMY  1980   • NEPHRECTOMY Right 2000   • NEUROMA SURGERY Left     excision of acoustic neuroma   • THYROIDECTOMY, PARTIAL  1975   • WRIST SURGERY         Family History   Problem Relation Age of Onset   • Stroke Brother    • Breast cancer Other    • Heart disease Other    • Breast cancer Mother    • Mitral valve prolapse Neg Hx        Social History     Socioeconomic History   • Marital status:      Spouse name: Not on file   • Number of children: Not on file   • Years of education: Not on file   • Highest education level: Not on file   Social Needs   • Financial resource strain: Not on file   • Food insecurity - worry: Not on file   • Food insecurity - inability: Not on file   • Transportation needs - medical: Not on file   • Transportation needs - non-medical: Not on file   Occupational History   • Not on file   Tobacco Use   • Smoking status: Former Smoker   Substance and Sexual Activity   • Alcohol use: No   • Drug use: No   • Sexual activity: Defer   Other Topics Concern   • Not on file   Social  "History Narrative   • Not on file       Current Outpatient Medications on File Prior to Visit   Medication Sig Dispense Refill   • ACCU-CHEK FASTCLIX LANCETS misc USE 1 DAILY 102 each 2   • ACCU-CHEK SMARTVIEW test strip TEST EVERY  each 3   • acetaminophen (TYLENOL) 500 MG tablet Take 500 mg by mouth every 6 (six) hours as needed for mild pain (1-3).     • Ascorbic Acid (VITAMIN C) 500 MG capsule Take 1 tablet by mouth Daily.     • betamethasone, augmented, (DIPROLENE) 0.05 % ointment APPLY TO SKIN ONCE DAILY APPLY TO ARMS AND LEGS TWICE DAILY  0   • Blood Glucose Calibration (ACCU-CHEK SMARTVIEW CONTROL VI) Accu-Chek SmartView In Vitro Strip; Patient Sig: Accu-Chek SmartView In Vitro Strip USE 1 TEST STRIP EVERY DAY; 100; 3; 19-Aug-2013; Active     • Cholecalciferol (VITAMIN D PO) Take 1 tablet by mouth Daily.     • glucose blood (ACCU-CHEK SMARTVIEW) test strip Use as instructed. 100 each 3   • glucose blood (ACCU-CHEK SMARTVIEW) test strip Use 1 Test strip every day to test blood glucose. 100 each 3   • Lancets Misc. (ACCU-CHEK FASTCLIX LANCET) kit      • lisinopril (PRINIVIL,ZESTRIL) 40 MG tablet take 1 tablet by mouth once daily 90 tablet 3   • ondansetron (ZOFRAN) 4 MG tablet Take 1 tablet by mouth Every 8 (Eight) Hours As Needed for Nausea or Vomiting. 15 tablet 0   • SYNTHROID 25 MCG tablet TAKE 1 TABLET BY MOUTH EVERY DAY EVERY MORNING 30 tablet 3   • travoprost, BAK free, (TRAVATAN) 0.004 % solution ophthalmic solution Apply  to eye.       No current facility-administered medications on file prior to visit.        Allergies   Allergen Reactions   • Amlodipine Rash   • Codeine Rash   • Penicillins Rash       Vitals:    12/10/18 1531   BP: 134/76   Pulse: 76   Resp: 18   SpO2: 98%   Weight: 48.1 kg (106 lb)   Height: 144.8 cm (57.01\")       Body mass index is 22.93 kg/m².    Objective   Physical Exam   Constitutional: She is oriented to person, place, and time. She appears well-developed and " well-nourished.   Neck: Muscular tenderness (and right sided mucle tightness) present. Decreased range of motion present.   Pulmonary/Chest: Effort normal. No respiratory distress.   Abdominal: Soft. Bowel sounds are normal. She exhibits no distension. There is no tenderness. There is no guarding.   Neurological: She is alert and oriented to person, place, and time.       Results for orders placed or performed during the hospital encounter of 08/16/18   POC Creatinine   Result Value Ref Range    Creatinine 0.70 0.60 - 1.30 mg/dL       Assessment/Plan   Diagnoses and all orders for this visit:    Full incontinence of feces    Change in bowel movement  -     CBC & Differential  -     Comprehensive Metabolic Panel    Acquired hypothyroidism  -     TSH    Neck pain on right side  -     Ambulatory Referral to Physical Therapy Evaluate and treat        Discussion/Summary  Natalee is here for multiple issues the main one of which is her fecal incontinence.  We are going to see how her TSH looks.  I am inclined to stop her synthroid and see if this helps with the bowel urgency and incont.  Her TSH has been less than one the last three checks.  I have put in a referral for PT for her neck pain which I suspect is causing radiating pain into her ear.  She has seen at least two different ENTs for this.  We discussed that some of her issues are age related but she wants to find a cure for all of her ailments.    No Follow-up on file.

## 2018-12-11 ENCOUNTER — TELEPHONE (OUTPATIENT)
Dept: INTERNAL MEDICINE | Facility: CLINIC | Age: 83
End: 2018-12-11

## 2018-12-11 LAB
ALBUMIN SERPL-MCNC: 4.4 G/DL (ref 3.5–5.2)
ALBUMIN/GLOB SERPL: 1.7 G/DL
ALP SERPL-CCNC: 110 U/L (ref 39–117)
ALT SERPL-CCNC: 14 U/L (ref 1–33)
AST SERPL-CCNC: 19 U/L (ref 1–32)
BASOPHILS # BLD AUTO: 0.11 10*3/MM3 (ref 0–0.2)
BASOPHILS NFR BLD AUTO: 1.8 % (ref 0–1.5)
BILIRUB SERPL-MCNC: 0.5 MG/DL (ref 0.1–1.2)
BUN SERPL-MCNC: 23 MG/DL (ref 8–23)
BUN/CREAT SERPL: 28 (ref 7–25)
CALCIUM SERPL-MCNC: 9.5 MG/DL (ref 8.6–10.5)
CHLORIDE SERPL-SCNC: 101 MMOL/L (ref 98–107)
CO2 SERPL-SCNC: 27.6 MMOL/L (ref 22–29)
CREAT SERPL-MCNC: 0.82 MG/DL (ref 0.57–1)
EOSINOPHIL # BLD AUTO: 0.3 10*3/MM3 (ref 0–0.7)
EOSINOPHIL NFR BLD AUTO: 4.8 % (ref 0.3–6.2)
ERYTHROCYTE [DISTWIDTH] IN BLOOD BY AUTOMATED COUNT: 14 % (ref 11.7–13)
GLOBULIN SER CALC-MCNC: 2.6 GM/DL
GLUCOSE SERPL-MCNC: 135 MG/DL (ref 65–99)
HBA1C MFR BLD: 5.9 % (ref 4.8–5.6)
HCT VFR BLD AUTO: 43.3 % (ref 35.6–45.5)
HGB BLD-MCNC: 14.4 G/DL (ref 11.9–15.5)
IMM GRANULOCYTES # BLD: 0 10*3/MM3 (ref 0–0.03)
IMM GRANULOCYTES NFR BLD: 0 % (ref 0–0.5)
LYMPHOCYTES # BLD AUTO: 1.5 10*3/MM3 (ref 0.9–4.8)
LYMPHOCYTES NFR BLD AUTO: 24 % (ref 19.6–45.3)
MCH RBC QN AUTO: 29.6 PG (ref 26.9–32)
MCHC RBC AUTO-ENTMCNC: 33.3 G/DL (ref 32.4–36.3)
MCV RBC AUTO: 89.1 FL (ref 80.5–98.2)
MONOCYTES # BLD AUTO: 0.33 10*3/MM3 (ref 0.2–1.2)
MONOCYTES NFR BLD AUTO: 5.3 % (ref 5–12)
NEUTROPHILS # BLD AUTO: 4.02 10*3/MM3 (ref 1.9–8.1)
NEUTROPHILS NFR BLD AUTO: 64.1 % (ref 42.7–76)
NRBC BLD AUTO-RTO: 0 /100 WBC (ref 0–0)
PLATELET # BLD AUTO: 217 10*3/MM3 (ref 140–500)
POTASSIUM SERPL-SCNC: 4.3 MMOL/L (ref 3.5–5.2)
PROT SERPL-MCNC: 7 G/DL (ref 6–8.5)
RBC # BLD AUTO: 4.86 10*6/MM3 (ref 3.9–5.2)
SODIUM SERPL-SCNC: 141 MMOL/L (ref 136–145)
TSH SERPL DL<=0.005 MIU/L-ACNC: 0.99 MIU/ML (ref 0.27–4.2)
WBC # BLD AUTO: 6.26 10*3/MM3 (ref 4.5–10.7)

## 2019-02-11 ENCOUNTER — TELEPHONE (OUTPATIENT)
Dept: INTERNAL MEDICINE | Facility: CLINIC | Age: 84
End: 2019-02-11

## 2019-02-11 NOTE — TELEPHONE ENCOUNTER
Pt called and left a voicemail over lunch stating that her ride from her living assistant home is not going to work, would like to reschedule or figure out a time, can you please call this pt and find a 30 minute apt time that will work for her.

## 2019-02-12 DIAGNOSIS — E11.29 TYPE 2 DIABETES MELLITUS WITH MICROALBUMINURIA, WITHOUT LONG-TERM CURRENT USE OF INSULIN (HCC): ICD-10-CM

## 2019-02-12 DIAGNOSIS — E03.9 ACQUIRED HYPOTHYROIDISM: ICD-10-CM

## 2019-02-12 DIAGNOSIS — I10 ESSENTIAL HYPERTENSION: ICD-10-CM

## 2019-02-12 DIAGNOSIS — E78.2 MIXED HYPERLIPIDEMIA: Primary | ICD-10-CM

## 2019-02-12 DIAGNOSIS — R80.9 TYPE 2 DIABETES MELLITUS WITH MICROALBUMINURIA, WITHOUT LONG-TERM CURRENT USE OF INSULIN (HCC): ICD-10-CM

## 2019-02-13 LAB
ALBUMIN SERPL-MCNC: 4.3 G/DL (ref 3.5–4.7)
ALBUMIN/GLOB SERPL: 1.9 {RATIO} (ref 1.2–2.2)
ALP SERPL-CCNC: 96 IU/L (ref 39–117)
ALT SERPL-CCNC: 11 IU/L (ref 0–32)
AST SERPL-CCNC: 16 IU/L (ref 0–40)
BASOPHILS # BLD AUTO: 0.1 X10E3/UL (ref 0–0.2)
BASOPHILS NFR BLD AUTO: 2 %
BILIRUB SERPL-MCNC: 0.4 MG/DL (ref 0–1.2)
BUN SERPL-MCNC: 24 MG/DL (ref 8–27)
BUN/CREAT SERPL: 27 (ref 12–28)
CALCIUM SERPL-MCNC: 9.2 MG/DL (ref 8.7–10.3)
CHLORIDE SERPL-SCNC: 105 MMOL/L (ref 96–106)
CO2 SERPL-SCNC: 25 MMOL/L (ref 20–29)
CREAT SERPL-MCNC: 0.88 MG/DL (ref 0.57–1)
EOSINOPHIL # BLD AUTO: 0.3 X10E3/UL (ref 0–0.4)
EOSINOPHIL NFR BLD AUTO: 5 %
ERYTHROCYTE [DISTWIDTH] IN BLOOD BY AUTOMATED COUNT: 15.2 % (ref 12.3–15.4)
GLOBULIN SER CALC-MCNC: 2.3 G/DL (ref 1.5–4.5)
GLUCOSE SERPL-MCNC: 89 MG/DL (ref 65–99)
HBA1C MFR BLD: 6.5 % (ref 4.8–5.6)
HCT VFR BLD AUTO: 40.7 % (ref 34–46.6)
HGB BLD-MCNC: 13.3 G/DL (ref 11.1–15.9)
IMM GRANULOCYTES # BLD AUTO: 0 X10E3/UL (ref 0–0.1)
IMM GRANULOCYTES NFR BLD AUTO: 0 %
LYMPHOCYTES # BLD AUTO: 1.4 X10E3/UL (ref 0.7–3.1)
LYMPHOCYTES NFR BLD AUTO: 25 %
MCH RBC QN AUTO: 28.5 PG (ref 26.6–33)
MCHC RBC AUTO-ENTMCNC: 32.7 G/DL (ref 31.5–35.7)
MCV RBC AUTO: 87 FL (ref 79–97)
MONOCYTES # BLD AUTO: 0.5 X10E3/UL (ref 0.1–0.9)
MONOCYTES NFR BLD AUTO: 9 %
NEUTROPHILS # BLD AUTO: 3.2 X10E3/UL (ref 1.4–7)
NEUTROPHILS NFR BLD AUTO: 59 %
PLATELET # BLD AUTO: 216 X10E3/UL (ref 150–379)
POTASSIUM SERPL-SCNC: 3.7 MMOL/L (ref 3.5–5.2)
PROT SERPL-MCNC: 6.6 G/DL (ref 6–8.5)
RBC # BLD AUTO: 4.67 X10E6/UL (ref 3.77–5.28)
SODIUM SERPL-SCNC: 145 MMOL/L (ref 134–144)
TSH SERPL DL<=0.005 MIU/L-ACNC: 1.38 UIU/ML (ref 0.45–4.5)
WBC # BLD AUTO: 5.5 X10E3/UL (ref 3.4–10.8)

## 2019-02-19 ENCOUNTER — OFFICE VISIT (OUTPATIENT)
Dept: INTERNAL MEDICINE | Facility: CLINIC | Age: 84
End: 2019-02-19

## 2019-02-19 VITALS
BODY MASS INDEX: 22.87 KG/M2 | HEART RATE: 67 BPM | OXYGEN SATURATION: 97 % | HEIGHT: 57 IN | WEIGHT: 106 LBS | RESPIRATION RATE: 18 BRPM | SYSTOLIC BLOOD PRESSURE: 142 MMHG | DIASTOLIC BLOOD PRESSURE: 84 MMHG

## 2019-02-19 DIAGNOSIS — R80.9 TYPE 2 DIABETES MELLITUS WITH MICROALBUMINURIA, WITHOUT LONG-TERM CURRENT USE OF INSULIN (HCC): Primary | ICD-10-CM

## 2019-02-19 DIAGNOSIS — E78.2 MIXED HYPERLIPIDEMIA: ICD-10-CM

## 2019-02-19 DIAGNOSIS — E11.29 TYPE 2 DIABETES MELLITUS WITH MICROALBUMINURIA, WITHOUT LONG-TERM CURRENT USE OF INSULIN (HCC): Primary | ICD-10-CM

## 2019-02-19 DIAGNOSIS — I10 ESSENTIAL HYPERTENSION: ICD-10-CM

## 2019-02-19 PROCEDURE — 99214 OFFICE O/P EST MOD 30 MIN: CPT | Performed by: INTERNAL MEDICINE

## 2019-02-19 NOTE — PROGRESS NOTES
Chief Complaint  Natalee Klein is a 88 y.o. female who presents for Hyperlipidemia; Hypertension; Diabetes; Hypothyroidism; and Follow-up (6 month )  .    History of Present Illness   Natalee is here for routine follow up on her HTN, HLD, DM, Hypothyroid.   She again has brought up her bowel movements.  She is having bowel movements nearly every day.  She seems to be annoyed by this.  She doesn't have diarrhea.  She has regular bowel movements.  She is having 1-2 formed normal bowel movements every day.  She cannot comprehend that this is normal.  She has moved into [facility].  Prior to moving into this place she hadn't been eating much or cooking.  Her weight was going down.  Now her weight is stable.  No cp or palp or dizziness or edema or soa.       Review of Systems   Constitution: Negative for weight gain and weight loss.   Cardiovascular: Positive for leg swelling (ankles). Negative for chest pain, dyspnea on exertion and palpitations.   Respiratory: Negative for cough and shortness of breath.    Skin: Positive for itching.   Gastrointestinal: Negative for bloating, abdominal pain, bowel incontinence, constipation and diarrhea.   Neurological: Negative for dizziness and light-headedness.       Patient Active Problem List   Diagnosis   • Stenosis of carotid artery   • Degeneration of intervertebral disc of lumbar region   • Diabetes mellitus (CMS/HCC)   • Hyperlipidemia   • Hypertension   • Hypothyroidism   • Paroxysmal atrial fibrillation (CMS/HCC)   • Temporary cerebral vascular dysfunction   • Pruritic rash   • Urinary hesitancy   • Difficulty urinating   • Diabetic peripheral neuropathy (CMS/HCC)   • Deformity of both feet   • Abnormal weight loss   • Elevated alkaline phosphatase level   • Pancreatic cyst   • Bowel habit changes   • Hx of adenomatous colonic polyps   • Fecal incontinence       Past Medical History:   Diagnosis Date   • Carotid artery stenosis    • Colon polyp 05/17/2007     tubular adenoma, 60 cm   • Colon polyp 11/11/2003    tubular adenoma, cecum   • DDD (degenerative disc disease), lumbar    • Diabetes mellitus (CMS/HCC)    • Disease of thyroid gland    • Hyperlipidemia    • Hypertension    • Hypokalemia    • Irregular heart beat    • Osteopenia    • PAF (paroxysmal atrial fibrillation) (CMS/HCC)    • Pruritus    • Sciatica    • TIA (transient ischemic attack)        Past Surgical History:   Procedure Laterality Date   • CATARACT EXTRACTION Bilateral     Dr. Vivar   • CHOLECYSTECTOMY N/A 05/09/2001    Dr. Adam Peterson   • COLONOSCOPY W/ POLYPECTOMY N/A 11/11/2003    Small cecal polyp-Dr. Adam Peterson   • COLONOSCOPY W/ POLYPECTOMY N/A 05/17/2007    Non-thrombosed external hemorrhoids, diverticulosis, one 7 mm, non-bleeding polyp at 60 cm proximal to the anus-Dr. Adam Peterson   • FOOT SURGERY     • HEMORRHOIDECTOMY N/A 02/09/2004    Dr. Carlos Hyman   • HYSTERECTOMY  1980   • NEPHRECTOMY Right 2000   • NEUROMA SURGERY Left     excision of acoustic neuroma   • THYROIDECTOMY, PARTIAL  1975   • WRIST SURGERY         Family History   Problem Relation Age of Onset   • Stroke Brother    • Breast cancer Other    • Heart disease Other    • Breast cancer Mother    • Mitral valve prolapse Neg Hx        Social History     Socioeconomic History   • Marital status:      Spouse name: Not on file   • Number of children: Not on file   • Years of education: Not on file   • Highest education level: Not on file   Social Needs   • Financial resource strain: Not on file   • Food insecurity - worry: Not on file   • Food insecurity - inability: Not on file   • Transportation needs - medical: Not on file   • Transportation needs - non-medical: Not on file   Occupational History   • Not on file   Tobacco Use   • Smoking status: Former Smoker   Substance and Sexual Activity   • Alcohol use: No   • Drug use: No   • Sexual activity: Defer   Other Topics Concern   • Not on file   Social  "History Narrative   • Not on file       Current Outpatient Medications on File Prior to Visit   Medication Sig Dispense Refill   • ACCU-CHEK FASTCLIX LANCETS misc USE 1 DAILY 102 each 2   • ACCU-CHEK SMARTVIEW test strip TEST EVERY  each 3   • acetaminophen (TYLENOL) 500 MG tablet Take 500 mg by mouth every 6 (six) hours as needed for mild pain (1-3).     • Ascorbic Acid (VITAMIN C) 500 MG capsule Take 1 tablet by mouth Daily.     • betamethasone, augmented, (DIPROLENE) 0.05 % ointment APPLY TO SKIN ONCE DAILY APPLY TO ARMS AND LEGS TWICE DAILY  0   • Blood Glucose Calibration (ACCU-CHEK SMARTVIEW CONTROL VI) Accu-Chek SmartView In Vitro Strip; Patient Sig: Accu-Chek SmartView In Vitro Strip USE 1 TEST STRIP EVERY DAY; 100; 3; 19-Aug-2013; Active     • Cholecalciferol (VITAMIN D PO) Take 1 tablet by mouth Daily.     • glucose blood (ACCU-CHEK SMARTVIEW) test strip Use as instructed. 100 each 3   • glucose blood (ACCU-CHEK SMARTVIEW) test strip Use 1 Test strip every day to test blood glucose. 100 each 3   • Lancets Misc. (ACCU-CHEK FASTCLIX LANCET) kit      • lisinopril (PRINIVIL,ZESTRIL) 40 MG tablet take 1 tablet by mouth once daily 90 tablet 3   • ondansetron (ZOFRAN) 4 MG tablet Take 1 tablet by mouth Every 8 (Eight) Hours As Needed for Nausea or Vomiting. 15 tablet 0   • travoprost, BAK free, (TRAVATAN) 0.004 % solution ophthalmic solution Apply  to eye.     • triamcinolone (KENALOG) 0.1 % ointment apply to affected area once daily  0   • [DISCONTINUED] SYNTHROID 25 MCG tablet TAKE 1 TABLET BY MOUTH EVERY DAY EVERY MORNING 30 tablet 3     No current facility-administered medications on file prior to visit.        Allergies   Allergen Reactions   • Amlodipine Rash   • Codeine Rash   • Penicillins Rash       Vitals:    02/19/19 1421   BP: 142/84   Pulse: 67   Resp: 18   SpO2: 97%   Weight: 48.1 kg (106 lb)   Height: 144.8 cm (57.01\")       Body mass index is 22.93 kg/m².    Objective   Physical Exam "   Constitutional: She is oriented to person, place, and time. She appears well-developed and well-nourished. No distress.   HENT:   Head: Normocephalic and atraumatic.   Eyes: Conjunctivae are normal. No scleral icterus.   Neck: Normal range of motion. Neck supple.   Cardiovascular: Normal rate, regular rhythm and normal heart sounds. Exam reveals no gallop and no friction rub.   No murmur heard.  Pulmonary/Chest: Effort normal and breath sounds normal. No respiratory distress. She has no wheezes. She has no rales.   Abdominal: Soft. Bowel sounds are normal. She exhibits no distension. There is no tenderness.   Musculoskeletal: She exhibits edema (1+ in ankles).   Lymphadenopathy:     She has no cervical adenopathy.   Neurological: She is alert and oriented to person, place, and time. No cranial nerve deficit.   Psychiatric: She has a normal mood and affect. Her behavior is normal. Judgment and thought content normal.       Results for orders placed or performed in visit on 02/12/19   Comprehensive Metabolic Panel   Result Value Ref Range    Glucose 89 65 - 99 mg/dL    BUN 24 8 - 27 mg/dL    Creatinine 0.88 0.57 - 1.00 mg/dL    eGFR Non African Am 59 (L) >59 mL/min/1.73    eGFR African Am 68 >59 mL/min/1.73    BUN/Creatinine Ratio 27 12 - 28    Sodium 145 (H) 134 - 144 mmol/L    Potassium 3.7 3.5 - 5.2 mmol/L    Chloride 105 96 - 106 mmol/L    Total CO2 25 20 - 29 mmol/L    Calcium 9.2 8.7 - 10.3 mg/dL    Total Protein 6.6 6.0 - 8.5 g/dL    Albumin 4.3 3.5 - 4.7 g/dL    Globulin 2.3 1.5 - 4.5 g/dL    A/G Ratio 1.9 1.2 - 2.2    Total Bilirubin 0.4 0.0 - 1.2 mg/dL    Alkaline Phosphatase 96 39 - 117 IU/L    AST (SGOT) 16 0 - 40 IU/L    ALT (SGPT) 11 0 - 32 IU/L   TSH Rfx On Abnormal To Free T4   Result Value Ref Range    TSH 1.380 0.450 - 4.500 uIU/mL   Hemoglobin A1c   Result Value Ref Range    Hemoglobin A1C 6.5 (H) 4.8 - 5.6 %   CBC & Differential   Result Value Ref Range    WBC 5.5 3.4 - 10.8 x10E3/uL    RBC 4.67  3.77 - 5.28 x10E6/uL    Hemoglobin 13.3 11.1 - 15.9 g/dL    Hematocrit 40.7 34.0 - 46.6 %    MCV 87 79 - 97 fL    MCH 28.5 26.6 - 33.0 pg    MCHC 32.7 31.5 - 35.7 g/dL    RDW 15.2 12.3 - 15.4 %    Platelets 216 150 - 379 x10E3/uL    Neutrophil Rel % 59 Not Estab. %    Lymphocyte Rel % 25 Not Estab. %    Monocyte Rel % 9 Not Estab. %    Eosinophil Rel % 5 Not Estab. %    Basophil Rel % 2 Not Estab. %    Neutrophils Absolute 3.2 1.4 - 7.0 x10E3/uL    Lymphocytes Absolute 1.4 0.7 - 3.1 x10E3/uL    Monocytes Absolute 0.5 0.1 - 0.9 x10E3/uL    Eosinophils Absolute 0.3 0.0 - 0.4 x10E3/uL    Basophils Absolute 0.1 0.0 - 0.2 x10E3/uL    Immature Granulocyte Rel % 0 Not Estab. %    Immature Grans Absolute 0.0 0.0 - 0.1 x10E3/uL       Assessment/Plan   Diagnoses and all orders for this visit:    Type 2 diabetes mellitus with microalbuminuria, without long-term current use of insulin (CMS/[facility])    Essential hypertension    Mixed hyperlipidemia    Other orders  -     triamcinolone (KENALOG) 0.1 % ointment; apply to affected area once daily        Discussion/Summary  Natalee is here for follow up.  Overall, despite her concerns about her normal bowel movements, she is doing well.  Her labs all look good.  We stopped her synthroid to see if this helps with her increased bm frequency.  Continue current meds.  I will see her back in 6 months.  I spent a lot of time convincing her that her bowel movements are normal.     Return in about 6 months (around 8/19/2019) for Annual physical, with fasting labs prior.

## 2019-03-12 ENCOUNTER — TELEPHONE (OUTPATIENT)
Dept: INTERNAL MEDICINE | Facility: CLINIC | Age: 84
End: 2019-03-12

## 2019-03-12 NOTE — TELEPHONE ENCOUNTER
They received a call from [facility], she was using PT with her home, but wanted to continue it through home health with her insurance, since she was maxed out on outpatient benefits. Dx with generalized weakness and unsteady gait. Just needs a verbal okay to start home health PT.     Verbal okay given via voicemail.

## 2019-04-04 ENCOUNTER — TELEPHONE (OUTPATIENT)
Dept: INTERNAL MEDICINE | Facility: CLINIC | Age: 84
End: 2019-04-04

## 2019-04-08 NOTE — TELEPHONE ENCOUNTER
"Advise not to use this.  It's not FDA approved.  It has multiple \"root extracts\".  Not advised at her age.    "

## 2019-04-09 NOTE — TELEPHONE ENCOUNTER
For her GI issues, I could send her to GI to see if they have anything to offer.  Another option would be to get a CT scan again of her abd and pelvis.  Her last scan was in August last year.  This would assess both GI and Bladder.

## 2019-04-25 ENCOUNTER — OFFICE VISIT (OUTPATIENT)
Dept: INTERNAL MEDICINE | Facility: CLINIC | Age: 84
End: 2019-04-25

## 2019-04-25 VITALS
SYSTOLIC BLOOD PRESSURE: 128 MMHG | OXYGEN SATURATION: 98 % | HEART RATE: 91 BPM | DIASTOLIC BLOOD PRESSURE: 76 MMHG | WEIGHT: 106 LBS | RESPIRATION RATE: 18 BRPM | HEIGHT: 57 IN | BODY MASS INDEX: 22.87 KG/M2

## 2019-04-25 DIAGNOSIS — S80.812A ABRASION OF LEFT LOWER EXTREMITY, INITIAL ENCOUNTER: Primary | ICD-10-CM

## 2019-04-25 PROCEDURE — 99213 OFFICE O/P EST LOW 20 MIN: CPT | Performed by: INTERNAL MEDICINE

## 2019-04-25 NOTE — PROGRESS NOTES
Chief Complaint  Natalee Klein is a 88 y.o. female who presents for Leg Injury (Pt was sitting on a bench when it broke and scraped her leg. Happened two days ago. (tuesday)) and Wound Check  .    History of Present Illness   Natalee is here for acute care for a new issue.  She scraped her leg when she slid off of a concrete bench on Tuesday.  She scraped the back of her left leg and the top of her left knee.  Her wound was dressed with nonstick pads and neosporin.  She is here today to see if she needs wound care to follow this.  She is in an assisted living facility.      Review of Systems   Constitution: Negative for chills and fever.   Skin: Negative for poor wound healing.       Patient Active Problem List   Diagnosis   • Stenosis of carotid artery   • Degeneration of intervertebral disc of lumbar region   • Diabetes mellitus (CMS/HCC)   • Hyperlipidemia   • Hypertension   • Hypothyroidism   • Paroxysmal atrial fibrillation (CMS/HCC)   • Temporary cerebral vascular dysfunction   • Pruritic rash   • Urinary hesitancy   • Difficulty urinating   • Diabetic peripheral neuropathy (CMS/HCC)   • Deformity of both feet   • Abnormal weight loss   • Elevated alkaline phosphatase level   • Pancreatic cyst   • Bowel habit changes   • Hx of adenomatous colonic polyps   • Fecal incontinence       Past Medical History:   Diagnosis Date   • Carotid artery stenosis    • Colon polyp 05/17/2007    tubular adenoma, 60 cm   • Colon polyp 11/11/2003    tubular adenoma, cecum   • DDD (degenerative disc disease), lumbar    • Diabetes mellitus (CMS/HCC)    • Disease of thyroid gland    • Hyperlipidemia    • Hypertension    • Hypokalemia    • Irregular heart beat    • Osteopenia    • PAF (paroxysmal atrial fibrillation) (CMS/HCC)    • Pruritus    • Sciatica    • TIA (transient ischemic attack)        Past Surgical History:   Procedure Laterality Date   • CATARACT EXTRACTION Bilateral     Dr. Vivar   • CHOLECYSTECTOMY N/A 05/09/2001     Dr. Adam Peterson   • COLONOSCOPY W/ POLYPECTOMY N/A 11/11/2003    Small cecal polyp-Dr. Adam Peterson   • COLONOSCOPY W/ POLYPECTOMY N/A 05/17/2007    Non-thrombosed external hemorrhoids, diverticulosis, one 7 mm, non-bleeding polyp at 60 cm proximal to the anus-Dr. Adam Peterson   • FOOT SURGERY     • HEMORRHOIDECTOMY N/A 02/09/2004    Dr. Carlos Hyman   • HYSTERECTOMY  1980   • NEPHRECTOMY Right 2000   • NEUROMA SURGERY Left     excision of acoustic neuroma   • THYROIDECTOMY, PARTIAL  1975   • WRIST SURGERY         Family History   Problem Relation Age of Onset   • Stroke Brother    • Breast cancer Other    • Heart disease Other    • Breast cancer Mother    • Mitral valve prolapse Neg Hx        Social History     Socioeconomic History   • Marital status:      Spouse name: Not on file   • Number of children: Not on file   • Years of education: Not on file   • Highest education level: Not on file   Tobacco Use   • Smoking status: Former Smoker   Substance and Sexual Activity   • Alcohol use: No   • Drug use: No   • Sexual activity: Defer       Current Outpatient Medications on File Prior to Visit   Medication Sig Dispense Refill   • ACCU-CHEK FASTCLIX LANCETS misc USE 1 DAILY 102 each 2   • ACCU-CHEK SMARTVIEW test strip TEST EVERY  each 3   • acetaminophen (TYLENOL) 500 MG tablet Take 500 mg by mouth every 6 (six) hours as needed for mild pain (1-3).     • Ascorbic Acid (VITAMIN C) 500 MG capsule Take 1 tablet by mouth Daily.     • betamethasone, augmented, (DIPROLENE) 0.05 % ointment APPLY TO SKIN ONCE DAILY APPLY TO ARMS AND LEGS TWICE DAILY  0   • Blood Glucose Calibration (ACCU-CHEK SMARTVIEW CONTROL VI) Accu-Chek SmartView In Vitro Strip; Patient Sig: Accu-Chek SmartView In Vitro Strip USE 1 TEST STRIP EVERY DAY; 100; 3; 19-Aug-2013; Active     • Cholecalciferol (VITAMIN D PO) Take 1 tablet by mouth Daily.     • glucose blood (ACCU-CHEK SMARTVIEW) test strip Use as instructed. 100  "each 3   • glucose blood (ACCU-CHEK SMARTVIEW) test strip Use 1 Test strip every day to test blood glucose. 100 each 3   • Lancets Misc. (ACCU-CHEK FASTCLIX LANCET) kit      • lisinopril (PRINIVIL,ZESTRIL) 40 MG tablet take 1 tablet by mouth once daily 90 tablet 3   • ondansetron (ZOFRAN) 4 MG tablet Take 1 tablet by mouth Every 8 (Eight) Hours As Needed for Nausea or Vomiting. 15 tablet 0   • travoprost, BAK free, (TRAVATAN) 0.004 % solution ophthalmic solution Apply  to eye.     • triamcinolone (KENALOG) 0.1 % ointment apply to affected area once daily  0     No current facility-administered medications on file prior to visit.        Allergies   Allergen Reactions   • Amlodipine Rash   • Codeine Rash   • Penicillins Rash       Vitals:    04/25/19 1300   BP: 128/76   Pulse: 91   Resp: 18   SpO2: 98%   Weight: 48.1 kg (106 lb)   Height: 144.8 cm (57.01\")       Body mass index is 22.93 kg/m².    Objective   Physical Exam   Constitutional: She appears well-developed and well-nourished. No distress.   Neurological: She is alert.   Skin: Abrasion (posterior left calf 8 cm by 3 cm.  no purulent drainage.  skin flap adherent to the wound.  minimal bleeding.) noted. No ecchymosis noted.   Psychiatric: She has a normal mood and affect. Her behavior is normal.       Results for orders placed or performed in visit on 02/12/19   Comprehensive Metabolic Panel   Result Value Ref Range    Glucose 89 65 - 99 mg/dL    BUN 24 8 - 27 mg/dL    Creatinine 0.88 0.57 - 1.00 mg/dL    eGFR Non African Am 59 (L) >59 mL/min/1.73    eGFR African Am 68 >59 mL/min/1.73    BUN/Creatinine Ratio 27 12 - 28    Sodium 145 (H) 134 - 144 mmol/L    Potassium 3.7 3.5 - 5.2 mmol/L    Chloride 105 96 - 106 mmol/L    Total CO2 25 20 - 29 mmol/L    Calcium 9.2 8.7 - 10.3 mg/dL    Total Protein 6.6 6.0 - 8.5 g/dL    Albumin 4.3 3.5 - 4.7 g/dL    Globulin 2.3 1.5 - 4.5 g/dL    A/G Ratio 1.9 1.2 - 2.2    Total Bilirubin 0.4 0.0 - 1.2 mg/dL    Alkaline " Phosphatase 96 39 - 117 IU/L    AST (SGOT) 16 0 - 40 IU/L    ALT (SGPT) 11 0 - 32 IU/L   TSH Rfx On Abnormal To Free T4   Result Value Ref Range    TSH 1.380 0.450 - 4.500 uIU/mL   Hemoglobin A1c   Result Value Ref Range    Hemoglobin A1C 6.5 (H) 4.8 - 5.6 %   CBC & Differential   Result Value Ref Range    WBC 5.5 3.4 - 10.8 x10E3/uL    RBC 4.67 3.77 - 5.28 x10E6/uL    Hemoglobin 13.3 11.1 - 15.9 g/dL    Hematocrit 40.7 34.0 - 46.6 %    MCV 87 79 - 97 fL    MCH 28.5 26.6 - 33.0 pg    MCHC 32.7 31.5 - 35.7 g/dL    RDW 15.2 12.3 - 15.4 %    Platelets 216 150 - 379 x10E3/uL    Neutrophil Rel % 59 Not Estab. %    Lymphocyte Rel % 25 Not Estab. %    Monocyte Rel % 9 Not Estab. %    Eosinophil Rel % 5 Not Estab. %    Basophil Rel % 2 Not Estab. %    Neutrophils Absolute 3.2 1.4 - 7.0 x10E3/uL    Lymphocytes Absolute 1.4 0.7 - 3.1 x10E3/uL    Monocytes Absolute 0.5 0.1 - 0.9 x10E3/uL    Eosinophils Absolute 0.3 0.0 - 0.4 x10E3/uL    Basophils Absolute 0.1 0.0 - 0.2 x10E3/uL    Immature Granulocyte Rel % 0 Not Estab. %    Immature Grans Absolute 0.0 0.0 - 0.1 x10E3/uL       Assessment/Plan   Diagnoses and all orders for this visit:    Abrasion of left lower extremity, initial encounter        Discussion/Summary  Natalee is here for acute care for a new issue.  We removed the old dressing with ease.  Cleaned wound with peroxide.  Re applied neosporin and a nonadherent pad.  Wrapped with guaze then coban.  Advised that they redress the wound daily.  They are going to see if the nurses at the assisted living facility can do this daily.  If they can't, we will need to put in a referral for home health.  She has a small abrasion on her left knee that we changed the band aid on.    No Follow-up on file.

## 2019-05-09 ENCOUNTER — TELEPHONE (OUTPATIENT)
Dept: INTERNAL MEDICINE | Facility: CLINIC | Age: 84
End: 2019-05-09

## 2019-05-09 NOTE — TELEPHONE ENCOUNTER
Patient called asking if it was OK for her to use Aspercreme for her shoulder.  I discussed this with Dr. Prieto and she said it was fine.  Explained this to the patient.  She expressed understanding.

## 2019-05-10 ENCOUNTER — OFFICE VISIT (OUTPATIENT)
Dept: INTERNAL MEDICINE | Facility: CLINIC | Age: 84
End: 2019-05-10

## 2019-05-10 VITALS
HEART RATE: 85 BPM | TEMPERATURE: 97.9 F | SYSTOLIC BLOOD PRESSURE: 124 MMHG | HEIGHT: 57 IN | OXYGEN SATURATION: 94 % | BODY MASS INDEX: 23.51 KG/M2 | WEIGHT: 109 LBS | DIASTOLIC BLOOD PRESSURE: 88 MMHG

## 2019-05-10 DIAGNOSIS — S81.802A LEG WOUND, LEFT, INITIAL ENCOUNTER: Primary | ICD-10-CM

## 2019-05-10 DIAGNOSIS — T14.8XXD WOUND HEALING, DELAYED: ICD-10-CM

## 2019-05-10 PROCEDURE — 99214 OFFICE O/P EST MOD 30 MIN: CPT | Performed by: INTERNAL MEDICINE

## 2019-05-10 RX ORDER — DORZOLAMIDE HYDROCHLORIDE AND TIMOLOL MALEATE 20; 5 MG/ML; MG/ML
SOLUTION/ DROPS OPHTHALMIC
Refills: 0 | COMMUNITY
Start: 2019-04-25

## 2019-05-10 NOTE — PROGRESS NOTES
"sore on left leg      HPI  Natalee Klein is a 88 y.o. female RTC In acute care:   Fell off bench on 4/23/19 at [facility].  \"the seat threw me off\".  Scraped leg in multiple places at the time. 'It is not healing as well as it should\".  Nurse at assisted living facility \"did not like it this AM and told should see the doctor\".    Son notes \"it was pretty nasty, looks a lot better to me\".  Saw Dr. Prieto last week for this.  Had wrapped daily with nurse at [facility], \"almost every day\" but missed weekend dressing changes to this AM.  Is painful, \"very much so\".  Not sure if better or worse. No fevers or chills, but notes \"I dont know\".    Son here with pt. Notes that has RN at assisted living facility but really they do not provide medical care, per se.  Son notes that has been using hydrogen peroxide on wound daily at dressing change and then will apply Neosporin before wrapping wound.       Review of Systems   Constitution: Negative for chills and fever.        PT is not sure on fever. Felt hot at one point yesterday.    Skin: Positive for poor wound healing (on L leg). Negative for color change, itching and rash.   Musculoskeletal: Positive for falls (in late April, scrape to leg. ).       The following portions of the patient's history were reviewed and updated as appropriate: allergies, current medications, past medical history, past social history and problem list.      Current Outpatient Medications:   •  ACCU-CHEK FASTCLIX LANCETS [facility] [place], USE 1 DAILY, Disp: 102 each, Rfl: 2  •  ACCU-CHEK SMARTVIEW test strip, TEST EVERY DAY, Disp: 100 each, Rfl: 3  •  acetaminophen (TYLENOL) 500 MG tablet, Take 500 mg by mouth every 6 (six) hours as needed for mild pain (1-3)., Disp: , Rfl:   •  Ascorbic Acid (VITAMIN C) 500 MG capsule, Take 1 tablet by mouth Daily., Disp: , Rfl:   •  betamethasone, augmented, (DIPROLENE) 0.05 % ointment, APPLY TO SKIN ONCE DAILY APPLY TO ARMS AND LEGS TWICE DAILY, Disp: , Rfl: " "0  •  Blood Glucose Calibration (ACCU-CHEK SMARTVIEW CONTROL VI), Accu-Chek SmartView In Vitro Strip; Patient Sig: Accu-Chek SmartView In Vitro Strip USE 1 TEST STRIP EVERY DAY; 100; 3; 19-Aug-2013; Active, Disp: , Rfl:   •  Cholecalciferol (VITAMIN D PO), Take 1 tablet by mouth Daily., Disp: , Rfl:   •  glucose blood (ACCU-CHEK SMARTVIEW) test strip, Use as instructed., Disp: 100 each, Rfl: 3  •  glucose blood (ACCU-CHEK SMARTVIEW) test strip, Use 1 Test strip every day to test blood glucose., Disp: 100 each, Rfl: 3  •  Lancets Misc. (ACCU-CHEK FASTCLIX LANCET) kit, , Disp: , Rfl:   •  lisinopril (PRINIVIL,ZESTRIL) 40 MG tablet, take 1 tablet by mouth once daily, Disp: 90 tablet, Rfl: 3  •  travoprost, KAIT free, (TRAVATAN) 0.004 % solution ophthalmic solution, Apply  to eye., Disp: , Rfl:   •  triamcinolone (KENALOG) 0.1 % ointment, apply to affected area once daily, Disp: , Rfl: 0  •  dorzolamide-timolol (COSOPT) 22.3-6.8 MG/ML ophthalmic solution, instill 1 drop into affected eye twice a day, Disp: , Rfl: 0  •  mupirocin (BACTROBAN) 2 % ointment, Apply  topically to the appropriate area as directed Daily., Disp: 22 g, Rfl: 1    Vitals:    05/10/19 1405   BP: 124/88   Pulse: 85   Temp: 97.9 °F (36.6 °C)   SpO2: 94%   Weight: 49.4 kg (109 lb)   Height: 144.8 cm (57\")         Physical Exam   Constitutional: She is oriented to person, place, and time. She appears well-developed and well-nourished. No distress.   HENT:   Head: Normocephalic and atraumatic.   Cardiovascular:   Spider veins noted in B legs.      Pulmonary/Chest: Effort normal. No respiratory distress.   Musculoskeletal: She exhibits edema (1+ pitting BLE).   Lymphadenopathy:        Right: No inguinal adenopathy present.        Left: No inguinal adenopathy present.   No lymphatic streaking on L leg.   Neurological: She is alert and oriented to person, place, and time.   Skin:        Psychiatric: She has a normal mood and affect. Her behavior is normal. "       Assessment/ Plan  Diagnoses and all orders for this visit:    Leg wound, left, initial encounter  -     Discontinue: mupirocin (BACTROBAN) 2 % ointment; Apply  topically to the appropriate area as directed Daily.  -     mupirocin (BACTROBAN) 2 % ointment; Apply  topically to the appropriate area as directed Daily.  -     Ambulatory Referral to Wound Clinic    Wound healing, delayed  -     Ambulatory Referral to Wound Clinic    Other orders  -     dorzolamide-timolol (COSOPT) 22.3-6.8 MG/ML ophthalmic solution; instill 1 drop into affected eye twice a day        Return for Next scheduled follow up.      Discussion:  Natalee Klein is a 88 y.o. female RTC In acute care (new pt and issue to examiner) after fall off bench 4/23/19 with superficial scrape wound on posterior L leg.  Wound has been slow to heal but noted to improved per son.  Wound does not appear infected today but does have some superficial debris over wound on exam. I think part of issue is daily use of cellular toxic hydrogen peroxide impairing wound healing along with buildup of wound debris. Will change to wet-to-dry dressing daily in AM, removal in PM, and application of mupirocin ointment in PM with dry gauze only. Soap and water clean at shower time. D/C [place] use. Pt and son decline home health at this time as will interrupt pt PT work with [facility] program.  Will place referral to wound care clinic and can cancel if heals more rapidly over next week or so.  No Abx orally at this time indicated, no lymphatic streaking on exam.

## 2019-05-16 ENCOUNTER — TELEPHONE (OUTPATIENT)
Dept: INTERNAL MEDICINE | Facility: CLINIC | Age: 84
End: 2019-05-16

## 2019-05-16 NOTE — TELEPHONE ENCOUNTER
Please see if there are any openings in our office tomorrow for her to be seen.  She does not need to go to the ER.

## 2019-05-16 NOTE — TELEPHONE ENCOUNTER
Spoke to a nurse at the nursing home and she explained to me the son told the mother to start taking her Asprin again 2 days ago and within those 2 days patient has presented with a deep bruise that looks like a pooling of blood under the skin from the shoulder down to the elbow on the medial side. Patient has not had any trauma to arm. Spoke to Ivana and she advised me to let facility know patients need to go to the ER. Nurse agreed and will call son and let him know.

## 2019-05-16 NOTE — TELEPHONE ENCOUNTER
Patients son called and wanted to know if Dr. Prieto could just order a US of Natalee's arm. He really don't want to wait at the ER all night. Please advise.

## 2019-05-16 NOTE — TELEPHONE ENCOUNTER
Patient called and stated that she would like to see a provider tomorrow. She said that she has a bruise on her elbow and the nurse she sees says to have a doctor look at it. Patient stated that Dr. Islas saw her a couple of days ago and would like an appointment. Would Dr. Islas be able to see her tomorrow or what does he suggest for her?

## 2019-05-17 ENCOUNTER — OFFICE VISIT (OUTPATIENT)
Dept: INTERNAL MEDICINE | Facility: CLINIC | Age: 84
End: 2019-05-17

## 2019-05-17 VITALS
DIASTOLIC BLOOD PRESSURE: 70 MMHG | HEART RATE: 91 BPM | WEIGHT: 110 LBS | BODY MASS INDEX: 23.8 KG/M2 | SYSTOLIC BLOOD PRESSURE: 130 MMHG | OXYGEN SATURATION: 96 %

## 2019-05-17 DIAGNOSIS — M79.602 PAIN OF LEFT UPPER EXTREMITY: Primary | ICD-10-CM

## 2019-05-17 DIAGNOSIS — T14.8XXA BRUISING: ICD-10-CM

## 2019-05-17 DIAGNOSIS — M25.512 ACUTE PAIN OF LEFT SHOULDER: ICD-10-CM

## 2019-05-17 LAB
HCT VFR BLDA CALC: 42.4 % (ref 38–51)
HGB BLDA-MCNC: 13.3 G/DL (ref 12–17)
LYMPHOCYTES # BLD: 256 %
MCH, POC: 28.4
MCHC, POC: 31.4
MCV, POC: 90.3
PMV BLD: 7.5 FL
RBC, POC: 4.7
RDW, POC: 15.7
WBC # BLD: 7.6 10*3/UL

## 2019-05-17 PROCEDURE — 73030 X-RAY EXAM OF SHOULDER: CPT | Performed by: INTERNAL MEDICINE

## 2019-05-17 PROCEDURE — 99214 OFFICE O/P EST MOD 30 MIN: CPT | Performed by: INTERNAL MEDICINE

## 2019-05-17 PROCEDURE — 85025 COMPLETE CBC W/AUTO DIFF WBC: CPT | Performed by: INTERNAL MEDICINE

## 2019-05-17 RX ORDER — LISINOPRIL 40 MG/1
40 TABLET ORAL DAILY
Qty: 90 TABLET | Refills: 3 | Status: SHIPPED | OUTPATIENT
Start: 2019-05-17 | End: 2020-05-07

## 2019-05-17 NOTE — PROGRESS NOTES
"Chief Complaint   Patient presents with   • Arm Injury     Left arm     Echymosis left arm.   OA left shoulder.     History of Present Illness   Natalee Klein is a 88 y.o. female presents for acute. She has a wound on her left leg that is slow to heal. Occurred approx 4/ 23/19. She has been treating this topically and recently switched from hydrogen peroxide to wet to dry and son reports continued reduced size of wound and small border of erythema.   She now has a new wound on her left arm. She is in physical therapy. Therapist \"agressively\" was attempting to improve the range of motion of her left arm. She essentially has a fully frozen left shoulder with extremely limited rom of the left arm. She developed a large echymosis at site of manipulation. She has atrial fibrillation. She switched from xarelto to asa in November given increased bleeding risk.     The following portions of the patient's history were reviewed and updated as appropriate: allergies, current medications, past family history, past medical history, past social history, past surgical history and problem list.  Current Outpatient Medications on File Prior to Visit   Medication Sig Dispense Refill   • ACCU-CHEK FASTCLIX LANCETS misc USE 1 DAILY 102 each 2   • ACCU-CHEK SMARTVIEW test strip TEST EVERY  each 3   • acetaminophen (TYLENOL) 500 MG tablet Take 500 mg by mouth every 6 (six) hours as needed for mild pain (1-3).     • Ascorbic Acid (VITAMIN C) 500 MG capsule Take 1 tablet by mouth Daily.     • betamethasone, augmented, (DIPROLENE) 0.05 % ointment APPLY TO SKIN ONCE DAILY APPLY TO ARMS AND LEGS TWICE DAILY  0   • Blood Glucose Calibration (ACCU-CHEK SMARTVIEW CONTROL VI) Accu-Chek SmartView In Vitro Strip; Patient Sig: Accu-Chek SmartView In Vitro Strip USE 1 TEST STRIP EVERY DAY; 100; 3; 19-Aug-2013; Active     • Cholecalciferol (VITAMIN D PO) Take 1 tablet by mouth Daily.     • dorzolamide-timolol (COSOPT) 22.3-6.8 MG/ML " ophthalmic solution instill 1 drop into affected eye twice a day  0   • glucose blood (ACCU-CHEK SMARTVIEW) test strip Use as instructed. 100 each 3   • glucose blood (ACCU-CHEK SMARTVIEW) test strip Use 1 Test strip every day to test blood glucose. 100 each 3   • Lancets Misc. (ACCU-CHEK FASTCLIX LANCET) kit      • mupirocin (BACTROBAN) 2 % ointment Apply  topically to the appropriate area as directed Daily. 22 g 1   • travoprost, KAIT free, (TRAVATAN) 0.004 % solution ophthalmic solution Apply  to eye.     • triamcinolone (KENALOG) 0.1 % ointment apply to affected area once daily  0   • [DISCONTINUED] lisinopril (PRINIVIL,ZESTRIL) 40 MG tablet take 1 tablet by mouth once daily 90 tablet 3     No current facility-administered medications on file prior to visit.      Review of Systems    Objective   Physical Exam   Constitutional: She is oriented to person, place, and time.   Thin white female. Elderly. Advanced arthritis. 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    HENT:   Head: Normocephalic and atraumatic.   Right Ear: External ear normal.   Left Ear: External ear normal.   Nose: Nose normal.   Mouth/Throat: Oropharynx is clear and moist.   Eyes: Conjunctivae and EOM are normal. Pupils are equal, round, and reactive to light.   Neck: Normal range of motion. Neck supple.   Cardiovascular: Normal rate, regular rhythm and normal heart sounds.   Pulmonary/Chest: Effort normal and breath sounds normal.   Abdominal: Soft.   Musculoskeletal:   Extremely limited left upper extremity related to oa   Neurological: She is alert and oriented to person, place, and time.   Skin: Skin is warm and dry.   Psychiatric: She has a normal mood and affect. Her behavior is normal. Judgment and thought content normal.   Nursing note and vitals reviewed.       /70   Pulse 91   Wt 49.9 kg (110 lb)   SpO2 96%   BMI 23.80 kg/m²     Assessment/Plan   Diagnoses and all orders for this visit:    Pain of left upper  extremity  -     XR Shoulder 2+ View Left (In Office)    Acute pain of left shoulder  -     XR Shoulder 2+ View Left (In Office)    Other orders  -     lisinopril (PRINIVIL,ZESTRIL) 40 MG tablet; Take 1 tablet by mouth Daily.      ADDENDUM  Contacted by radiology. Abnormality noted on xray at the scapula. Ct recommended. Left message on son's machine and contacted patient to notify as well. Ordered ct scapula. No contrast needed.   JW  Patient w/ left upper extremity pain and bruising. Xray w/ no acute findings. Will send for over read. She is to use an ice pack to the area every 2 hours for 15 min not directly to the skin. She will have less aggressive physical therapy. Continue  Current wound care. She will follow up routinely.

## 2019-05-20 DIAGNOSIS — R93.89 ABNORMAL X-RAY: Primary | ICD-10-CM

## 2019-05-20 DIAGNOSIS — M79.602 LEFT ARM PAIN: ICD-10-CM

## 2019-05-21 ENCOUNTER — TELEPHONE (OUTPATIENT)
Dept: INTERNAL MEDICINE | Facility: CLINIC | Age: 84
End: 2019-05-21

## 2019-05-21 NOTE — TELEPHONE ENCOUNTER
Pts son called to ask if you could call him to explain why the CT of the back is necessary.   Gordy (must dial) [phone]

## 2019-05-22 ENCOUNTER — HOSPITAL ENCOUNTER (OUTPATIENT)
Dept: CT IMAGING | Facility: HOSPITAL | Age: 84
Discharge: HOME | End: 2019-05-22
Admitting: INTERNAL MEDICINE

## 2019-05-22 DIAGNOSIS — R93.89 ABNORMAL X-RAY: ICD-10-CM

## 2019-05-22 DIAGNOSIS — M79.602 LEFT ARM PAIN: ICD-10-CM

## 2019-05-22 PROCEDURE — 73200 CT UPPER EXTREMITY W/O DYE: CPT

## 2019-06-27 ENCOUNTER — APPOINTMENT (OUTPATIENT)
Dept: GENERAL RADIOLOGY | Facility: HOSPITAL | Age: 84
End: 2019-06-27

## 2019-06-27 PROCEDURE — 73130 X-RAY EXAM OF HAND: CPT | Performed by: GENERAL PRACTICE

## 2019-08-22 DIAGNOSIS — I10 ESSENTIAL HYPERTENSION: ICD-10-CM

## 2019-08-22 DIAGNOSIS — E11.29 TYPE 2 DIABETES MELLITUS WITH MICROALBUMINURIA, WITHOUT LONG-TERM CURRENT USE OF INSULIN (HCC): ICD-10-CM

## 2019-08-22 DIAGNOSIS — E03.9 ACQUIRED HYPOTHYROIDISM: ICD-10-CM

## 2019-08-22 DIAGNOSIS — R80.9 TYPE 2 DIABETES MELLITUS WITH MICROALBUMINURIA, WITHOUT LONG-TERM CURRENT USE OF INSULIN (HCC): ICD-10-CM

## 2019-08-22 DIAGNOSIS — E78.2 MIXED HYPERLIPIDEMIA: Primary | ICD-10-CM

## 2019-08-22 DIAGNOSIS — Z00.00 HEALTHCARE MAINTENANCE: ICD-10-CM

## 2019-09-03 ENCOUNTER — OFFICE VISIT (OUTPATIENT)
Dept: INTERNAL MEDICINE | Facility: CLINIC | Age: 84
End: 2019-09-03

## 2019-09-03 VITALS
HEART RATE: 82 BPM | WEIGHT: 107 LBS | DIASTOLIC BLOOD PRESSURE: 62 MMHG | SYSTOLIC BLOOD PRESSURE: 148 MMHG | BODY MASS INDEX: 23.08 KG/M2 | HEIGHT: 57 IN

## 2019-09-03 DIAGNOSIS — R15.9 FULL INCONTINENCE OF FECES: ICD-10-CM

## 2019-09-03 DIAGNOSIS — I10 ESSENTIAL HYPERTENSION: ICD-10-CM

## 2019-09-03 DIAGNOSIS — Z00.00 HEALTHCARE MAINTENANCE: Primary | ICD-10-CM

## 2019-09-03 DIAGNOSIS — N39.46 MIXED STRESS AND URGE URINARY INCONTINENCE: ICD-10-CM

## 2019-09-03 DIAGNOSIS — I48.0 PAROXYSMAL ATRIAL FIBRILLATION (HCC): ICD-10-CM

## 2019-09-03 PROBLEM — R63.4 ABNORMAL WEIGHT LOSS: Status: RESOLVED | Noted: 2018-01-11 | Resolved: 2019-09-03

## 2019-09-03 PROCEDURE — 90653 IIV ADJUVANT VACCINE IM: CPT | Performed by: INTERNAL MEDICINE

## 2019-09-03 PROCEDURE — 99214 OFFICE O/P EST MOD 30 MIN: CPT | Performed by: INTERNAL MEDICINE

## 2019-09-03 PROCEDURE — G0439 PPPS, SUBSEQ VISIT: HCPCS | Performed by: INTERNAL MEDICINE

## 2019-09-03 PROCEDURE — G0008 ADMIN INFLUENZA VIRUS VAC: HCPCS | Performed by: INTERNAL MEDICINE

## 2019-09-03 PROCEDURE — 96160 PT-FOCUSED HLTH RISK ASSMT: CPT | Performed by: INTERNAL MEDICINE

## 2019-09-03 PROCEDURE — 69210 REMOVE IMPACTED EAR WAX UNI: CPT | Performed by: INTERNAL MEDICINE

## 2019-09-03 NOTE — PATIENT INSTRUCTIONS
Medicare Wellness  Personal Prevention Plan of Service     Date of Office Visit:  2019  Encounter Provider:  Gracy Norris MD  Place of Service:  [facility] INTER[facility] MED[facility]  Patient Name: Natalee Klein  :  1930    As part of the Medicare Wellness portion of your visit today, we are providing you with this personalized preventive plan of services (PPPS). This plan is based upon recommendations of the [place] Preventive Services Task Force (USPSTF) and the Advisory Committee on Immunization Practices (ACIP).    This lists the preventive care services that should be considered, and provides dates of when you are due. Items listed as completed are up-to-date and do not require any further intervention.    Health Maintenance   Topic Date Due   • ZOSTER VACCINE (2 of 2) 2015   • DXA SCAN  2016   • LIPID PANEL  2019   • URINE MICROALBUMIN  2019   • INFLUENZA VACCINE  2019   • MEDICARE ANNUAL WELLNESS  2019   • HEMOGLOBIN A1C  2019   • MAMMOGRAM  2019   • TDAP/TD VACCINES (2 - Td) 2026   • PNEUMOCOCCAL VACCINES (65+ LOW/MEDIUM RISK)  Completed       Orders Placed This Encounter   Procedures   • Fluad Quad >65 years       No Follow-up on file.

## 2019-09-03 NOTE — PROGRESS NOTES
"Subjective   AWV  Itching  Bowel and bladder incont  Arthritic pain  htn      Natalee Klein is a 89 y.o. female who presents for an annual wellness visit, to address acute issues, and to discuss chronic concerns. She is a new patient to me and has only been seen by me in may. She has a fairly complex medical history w/ multiple concerns today. Patient reports itching. She notes this of the whole body. She had \"patch testing\" through her allergist by patient report. Labs reveal and elevated alk phos. She is having some stool and urine incontinence. This has been a concern for an undetermined period of time. Per patient this has been present for several years but not sure how long. She reports regular bowel movements in general but does not sense when she is going to have a bm. She will take immodium to prevent this if she is going out. She also has lost control of her bladder. She has advanced oa w/ slow movement. She sometimes can not get to the bathroom in a timely fashion. 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 She has a history of a compression fracture. No listed therapy for this.  Patient had an acoustic neuroma and has facial paralysis as a result of this.   Patient has arthritic pain in her shoulders. She takes tylenol one tablet every evening for this. She has a full thickness rotator cuff tear that is chronic from a remote injury.       Review of Systems   Constitutional: Negative.    HENT: Negative.    Eyes: Negative.    Respiratory: Negative.    Cardiovascular: Negative.    Gastrointestinal:        Bowel incontinence   Endocrine: Negative.    Genitourinary:        Urinary incontinence   Musculoskeletal: Positive for arthralgias.   Skin: Negative.    Allergic/Immunologic: Negative.    Hematological: Negative.    Psychiatric/Behavioral: Negative.        The following portions of the patient's history were reviewed and updated as appropriate: allergies, current medications, past family history, past medical " history, past social history, past surgical history and problem list.     Patient Active Problem List   Diagnosis   • Stenosis of carotid artery   • Degeneration of intervertebral disc of lumbar region   • Diabetes mellitus (CMS/HCC)   • Hypertension   • Hypothyroidism   • Paroxysmal atrial fibrillation (CMS/HCC)   • Temporary cerebral vascular dysfunction   • Pruritic rash   • Mixed stress and urge urinary incontinence   • Difficulty urinating   • Diabetic peripheral neuropathy (CMS/HCC)   • Deformity of both feet   • Elevated alkaline phosphatase level   • Pancreatic cyst   • Bowel habit changes   • Hx of adenomatous colonic polyps   • Fecal incontinence       Past Medical History:   Diagnosis Date   • Carotid artery stenosis    • Colon polyp 05/17/2007    tubular adenoma, 60 cm   • Colon polyp 11/11/2003    tubular adenoma, cecum   • DDD (degenerative disc disease), lumbar    • Diabetes mellitus (CMS/HCC)    • Disease of thyroid gland    • Hyperlipidemia    • Hypertension    • Hypokalemia    • Irregular heart beat    • Osteopenia    • PAF (paroxysmal atrial fibrillation) (CMS/HCC)    • Pruritus    • Sciatica    • TIA (transient ischemic attack)        Past Surgical History:   Procedure Laterality Date   • CATARACT EXTRACTION Bilateral     Dr. Vivar   • CHOLECYSTECTOMY N/A 05/09/2001    Dr. Adam Peterson   • COLONOSCOPY W/ POLYPECTOMY N/A 11/11/2003    Small cecal polyp-Dr. Adam Peterson   • COLONOSCOPY W/ POLYPECTOMY N/A 05/17/2007    Non-thrombosed external hemorrhoids, diverticulosis, one 7 mm, non-bleeding polyp at 60 cm proximal to the anus-Dr. Adam Peterson   • FOOT SURGERY     • HEMORRHOIDECTOMY N/A 02/09/2004    Dr. Carlos Hyman   • HYSTERECTOMY  1980   • NEPHRECTOMY Right 2000   • NEUROMA SURGERY Left     excision of acoustic neuroma   • THYROIDECTOMY, PARTIAL  1975   • WRIST SURGERY         Family History   Problem Relation Age of Onset   • Stroke Brother    • Breast cancer Other    • Heart  disease Other    • Breast cancer Mother    • Mitral valve prolapse Neg Hx        Social History     Socioeconomic History   • Marital status:      Spouse name: Not on file   • Number of children: Not on file   • Years of education: Not on file   • Highest education level: Not on file   Tobacco Use   • Smoking status: Former Smoker   • Smokeless tobacco: Never Used   Substance and Sexual Activity   • Alcohol use: No   • Drug use: No   • Sexual activity: Defer       Current Outpatient Medications on File Prior to Visit   Medication Sig Dispense Refill   • ACCU-CHEK FASTCLIX LANCETS misc USE 1 DAILY 102 each 2   • ACCU-CHEK SMARTVIEW test strip TEST EVERY  each 3   • acetaminophen (TYLENOL) 500 MG tablet Take 500 mg by mouth every 6 (six) hours as needed for mild pain (1-3).     • Ascorbic Acid (VITAMIN C) 500 MG capsule Take 1 tablet by mouth Daily.     • Blood Glucose Calibration (ACCU-CHEK SMARTVIEW CONTROL VI) Accu-Chek SmartView In Vitro Strip; Patient Sig: Accu-Chek SmartView In Vitro Strip USE 1 TEST STRIP EVERY DAY; 100; 3; 19-Aug-2013; Active     • Cholecalciferol (VITAMIN D PO) Take 1 tablet by mouth Daily.     • dorzolamide-timolol (COSOPT) 22.3-6.8 MG/ML ophthalmic solution instill 1 drop into affected eye twice a day  0   • glucose blood (ACCU-CHEK SMARTVIEW) test strip Use as instructed. 100 each 3   • glucose blood (ACCU-CHEK SMARTVIEW) test strip Use 1 Test strip every day to test blood glucose. 100 each 3   • Lancets Misc. (ACCU-CHEK FASTCLIX LANCET) kit      • lisinopril (PRINIVIL,ZESTRIL) 40 MG tablet Take 1 tablet by mouth Daily. 90 tablet 3   • mupirocin (BACTROBAN) 2 % ointment Apply  topically to the appropriate area as directed Daily. 22 g 1   • travoprost, BAK free, (TRAVATAN) 0.004 % solution ophthalmic solution Apply  to eye.     • triamcinolone (KENALOG) 0.1 % ointment apply to affected area once daily  0     No current facility-administered medications on file prior to visit.   "      Allergies   Allergen Reactions   • Amlodipine Rash   • Codeine Rash   • Penicillins Rash       Immunization History   Administered Date(s) Administered   • Flu Vaccine High Dose PF 65YR+ 10/22/2016, 10/30/2017, 10/31/2018   • Fluad Quad 09/03/2019   • Influenza TIV (IM) 09/27/2013, 10/21/2014, 08/29/2015   • Pneumococcal Conjugate 13-Valent (PCV13) 06/13/2016   • Pneumococcal Polysaccharide (PPSV23) 06/21/2017       Objective     /62   Pulse 82   Ht 144.8 cm (57\")   Wt 48.5 kg (107 lb)   BMI 23.15 kg/m²     Physical Exam   Constitutional: She is oriented to person, place, and time. She appears well-developed and well-nourished.   HENT:   Right Ear: External ear normal.   Left Ear: External ear normal.   Nose: Nose normal.   Mouth/Throat: Oropharynx is clear and moist.   Left sided facial paralysis w/ ocular changes.   Right ear w/ cerumen impaction. Removed w/ lighted loop.    Eyes: Conjunctivae and EOM are normal. Pupils are equal, round, and reactive to light.   Neck: Normal range of motion. Neck supple.   Cardiovascular: Normal rate, regular rhythm and normal heart sounds.   Pulmonary/Chest: Effort normal and breath sounds normal.   Abdominal: Soft. Bowel sounds are normal.   Genitourinary:   Genitourinary Comments: Atrophic changes   Musculoskeletal:   Diffuse arthritic changes. Kyphosis. Very slow cautious gait. Needs full assistance transfer to table.   Decreased rom B shoulders.    Neurological: She is alert and oriented to person, place, and time.   Skin: Skin is warm and dry.   Psychiatric: She has a normal mood and affect. Her behavior is normal. Judgment and thought content normal.   Nursing note and vitals reviewed.      Assessment/Plan   Natalee was seen today for annual exam.    Diagnoses and all orders for this visit:    Healthcare maintenance    Essential hypertension    Paroxysmal atrial fibrillation (CMS/HCC)    Full incontinence of feces    Mixed stress and urge urinary " incontinence    Other orders  -     Discontinue: Mirabegron ER (MYRBETRIQ) 50 MG tablet sustained-release 24 hour 24 hr tablet; Take 50 mg by mouth Daily.  -     Mirabegron ER (MYRBETRIQ) 50 MG tablet sustained-release 24 hour 24 hr tablet; Take 50 mg by mouth Daily.  -     Fluad Quad >65 years  -     diclofenac (VOLTAREN) 1 % gel gel; Apply 4 g topically to the appropriate area as directed 4 (Four) Times a Day As Needed (shoulder pain).        Discussion    AWV.     Direct observation of cognitive ability was evaluated and is normal. Patient was given health advice or handouts on the following:  nutrition, fall prevention, exercise, weight management  Patient new to me w/ several issues. She is most concerned about her chronic challenges with fecal and urinary incontinence. She is to switch to decaf coffee. Will try myrbetriq. She also will start daily claritin for itching. Antihistamine and myrbetriq may alter bowel pattern. Would like a food diary for episodes. She reports stooling is normal in consistency. May try a probiotic. May need spinal MRI to see if compression fractures and ddd have altered her spinal cord signaling. Will try simple measures first. Last a1c was excellent and no meds are needed for glucose regulation at this time. She will try voltaren gel for her shoulder pain and may increase tylenol to tid. Will test thyroid levels. She has pronounced hearing ipariment. Cerumen impaction removed and she will continue w/ a hearing aid. All plans written down and discussed w/ her son as well. She will f/u in office in 4 months or prn.              Future Appointments   Date Time Provider Department Center   1/7/2020 10:45 AM Gracy Norris MD [facility] None

## 2019-09-04 LAB
ALBUMIN SERPL-MCNC: 4.2 G/DL (ref 3.5–5.2)
ALBUMIN/GLOB SERPL: 1.7 G/DL
ALP SERPL-CCNC: 141 U/L (ref 39–117)
ALT SERPL-CCNC: 15 U/L (ref 1–33)
APPEARANCE UR: CLEAR
AST SERPL-CCNC: 17 U/L (ref 1–32)
BACTERIA #/AREA URNS HPF: NORMAL /HPF
BASOPHILS # BLD AUTO: 0.11 10*3/MM3 (ref 0–0.2)
BASOPHILS NFR BLD AUTO: 2.1 % (ref 0–1.5)
BILIRUB SERPL-MCNC: 0.8 MG/DL (ref 0.2–1.2)
BILIRUB UR QL STRIP: NEGATIVE
BUN SERPL-MCNC: 18 MG/DL (ref 8–23)
BUN/CREAT SERPL: 21.2 (ref 7–25)
CALCIUM SERPL-MCNC: 9.5 MG/DL (ref 8.6–10.5)
CHLORIDE SERPL-SCNC: 103 MMOL/L (ref 98–107)
CHOLEST SERPL-MCNC: 184 MG/DL (ref 0–200)
CO2 SERPL-SCNC: 27.2 MMOL/L (ref 22–29)
COLOR UR: YELLOW
CREAT SERPL-MCNC: 0.85 MG/DL (ref 0.57–1)
EOSINOPHIL # BLD AUTO: 0.29 10*3/MM3 (ref 0–0.4)
EOSINOPHIL NFR BLD AUTO: 5.6 % (ref 0.3–6.2)
EPI CELLS #/AREA URNS HPF: NORMAL /HPF
ERYTHROCYTE [DISTWIDTH] IN BLOOD BY AUTOMATED COUNT: 13.8 % (ref 12.3–15.4)
GLOBULIN SER CALC-MCNC: 2.5 GM/DL
GLUCOSE SERPL-MCNC: 108 MG/DL (ref 65–99)
GLUCOSE UR QL: NEGATIVE
HBA1C MFR BLD: 6.8 % (ref 4.8–5.6)
HCT VFR BLD AUTO: 45.1 % (ref 34–46.6)
HDLC SERPL-MCNC: 66 MG/DL (ref 40–60)
HGB BLD-MCNC: 14 G/DL (ref 12–15.9)
HGB UR QL STRIP: NEGATIVE
IMM GRANULOCYTES # BLD AUTO: 0.03 10*3/MM3 (ref 0–0.05)
IMM GRANULOCYTES NFR BLD AUTO: 0.6 % (ref 0–0.5)
KETONES UR QL STRIP: NEGATIVE
LDLC SERPL CALC-MCNC: 106 MG/DL (ref 0–100)
LEUKOCYTE ESTERASE UR QL STRIP: NEGATIVE
LYMPHOCYTES # BLD AUTO: 1.25 10*3/MM3 (ref 0.7–3.1)
LYMPHOCYTES NFR BLD AUTO: 24 % (ref 19.6–45.3)
MCH RBC QN AUTO: 28.5 PG (ref 26.6–33)
MCHC RBC AUTO-ENTMCNC: 31 G/DL (ref 31.5–35.7)
MCV RBC AUTO: 91.7 FL (ref 79–97)
MICRO URNS: NORMAL
MICRO URNS: NORMAL
MONOCYTES # BLD AUTO: 0.44 10*3/MM3 (ref 0.1–0.9)
MONOCYTES NFR BLD AUTO: 8.4 % (ref 5–12)
NEUTROPHILS # BLD AUTO: 3.09 10*3/MM3 (ref 1.7–7)
NEUTROPHILS NFR BLD AUTO: 59.3 % (ref 42.7–76)
NITRITE UR QL STRIP: NEGATIVE
NRBC BLD AUTO-RTO: 0 /100 WBC (ref 0–0.2)
PH UR STRIP: 7.5 [PH] (ref 5–7.5)
PLATELET # BLD AUTO: 213 10*3/MM3 (ref 140–450)
POTASSIUM SERPL-SCNC: 4.6 MMOL/L (ref 3.5–5.2)
PROT SERPL-MCNC: 6.7 G/DL (ref 6–8.5)
PROT UR QL STRIP: NEGATIVE
RBC # BLD AUTO: 4.92 10*6/MM3 (ref 3.77–5.28)
RBC #/AREA URNS HPF: NORMAL /HPF
SODIUM SERPL-SCNC: 142 MMOL/L (ref 136–145)
SP GR UR: 1.01 (ref 1–1.03)
TRIGL SERPL-MCNC: 60 MG/DL (ref 0–150)
TSH SERPL DL<=0.005 MIU/L-ACNC: 1.68 UIU/ML (ref 0.27–4.2)
UNABLE TO VOID: NORMAL
URINALYSIS REFLEX: NORMAL
UROBILINOGEN UR STRIP-MCNC: 0.2 MG/DL (ref 0.2–1)
VLDLC SERPL CALC-MCNC: 12 MG/DL
WBC # BLD AUTO: 5.21 10*3/MM3 (ref 3.4–10.8)
WBC #/AREA URNS HPF: NORMAL /HPF

## 2019-10-04 ENCOUNTER — OFFICE VISIT (OUTPATIENT)
Dept: INTERNAL MEDICINE | Facility: CLINIC | Age: 84
End: 2019-10-04

## 2019-10-04 ENCOUNTER — HOSPITAL ENCOUNTER (OUTPATIENT)
Dept: CARDIOLOGY | Facility: HOSPITAL | Age: 84
Discharge: HOME | End: 2019-10-04
Admitting: INTERNAL MEDICINE

## 2019-10-04 VITALS
BODY MASS INDEX: 23.3 KG/M2 | DIASTOLIC BLOOD PRESSURE: 88 MMHG | HEART RATE: 86 BPM | SYSTOLIC BLOOD PRESSURE: 158 MMHG | HEIGHT: 57 IN | WEIGHT: 108 LBS

## 2019-10-04 DIAGNOSIS — M79.89 REDNESS AND SWELLING OF LOWER LEG: ICD-10-CM

## 2019-10-04 DIAGNOSIS — R23.8 REDNESS AND SWELLING OF LOWER LEG: ICD-10-CM

## 2019-10-04 DIAGNOSIS — M79.89 LEG SWELLING: Primary | ICD-10-CM

## 2019-10-04 DIAGNOSIS — M79.89 LEG SWELLING: ICD-10-CM

## 2019-10-04 DIAGNOSIS — M79.605 PAIN OF LEFT LOWER EXTREMITY: ICD-10-CM

## 2019-10-04 LAB
BH CV LOWER VASCULAR LEFT COMMON FEMORAL AUGMENT: NORMAL
BH CV LOWER VASCULAR LEFT COMMON FEMORAL COMPETENT: NORMAL
BH CV LOWER VASCULAR LEFT COMMON FEMORAL COMPRESS: NORMAL
BH CV LOWER VASCULAR LEFT COMMON FEMORAL PHASIC: NORMAL
BH CV LOWER VASCULAR LEFT COMMON FEMORAL SPONT: NORMAL
BH CV LOWER VASCULAR LEFT DISTAL FEMORAL COMPRESS: NORMAL
BH CV LOWER VASCULAR LEFT GASTRONEMIUS COMPRESS: NORMAL
BH CV LOWER VASCULAR LEFT GREATER SAPH AK COMPRESS: NORMAL
BH CV LOWER VASCULAR LEFT GREATER SAPH BK COMPRESS: NORMAL
BH CV LOWER VASCULAR LEFT MID FEMORAL AUGMENT: NORMAL
BH CV LOWER VASCULAR LEFT MID FEMORAL COMPETENT: NORMAL
BH CV LOWER VASCULAR LEFT MID FEMORAL COMPRESS: NORMAL
BH CV LOWER VASCULAR LEFT MID FEMORAL PHASIC: NORMAL
BH CV LOWER VASCULAR LEFT MID FEMORAL SPONT: NORMAL
BH CV LOWER VASCULAR LEFT PERONEAL COMPRESS: NORMAL
BH CV LOWER VASCULAR LEFT POPLITEAL AUGMENT: NORMAL
BH CV LOWER VASCULAR LEFT POPLITEAL COMPETENT: NORMAL
BH CV LOWER VASCULAR LEFT POPLITEAL COMPRESS: NORMAL
BH CV LOWER VASCULAR LEFT POPLITEAL PHASIC: NORMAL
BH CV LOWER VASCULAR LEFT POPLITEAL SPONT: NORMAL
BH CV LOWER VASCULAR LEFT POSTERIOR TIBIAL COMPRESS: NORMAL
BH CV LOWER VASCULAR LEFT PROXIMAL FEMORAL COMPRESS: NORMAL
BH CV LOWER VASCULAR LEFT SAPHENOFEMORAL JUNCTION COMPRESS: NORMAL
BH CV LOWER VASCULAR RIGHT COMMON FEMORAL AUGMENT: NORMAL
BH CV LOWER VASCULAR RIGHT COMMON FEMORAL COMPETENT: NORMAL
BH CV LOWER VASCULAR RIGHT COMMON FEMORAL COMPRESS: NORMAL
BH CV LOWER VASCULAR RIGHT COMMON FEMORAL PHASIC: NORMAL
BH CV LOWER VASCULAR RIGHT COMMON FEMORAL SPONT: NORMAL

## 2019-10-04 PROCEDURE — 99214 OFFICE O/P EST MOD 30 MIN: CPT | Performed by: INTERNAL MEDICINE

## 2019-10-04 PROCEDURE — 93971 EXTREMITY STUDY: CPT

## 2019-10-04 RX ORDER — SACCHAROMYCES BOULARDII 250 MG
250 CAPSULE ORAL 2 TIMES DAILY
Qty: 20 CAPSULE | Refills: 0 | Status: SHIPPED | OUTPATIENT
Start: 2019-10-04 | End: 2019-11-12

## 2019-10-04 RX ORDER — DOXYCYCLINE 100 MG/1
100 CAPSULE ORAL 2 TIMES DAILY
Qty: 14 CAPSULE | Refills: 0 | Status: SHIPPED | OUTPATIENT
Start: 2019-10-04 | End: 2020-04-15

## 2019-10-04 NOTE — PROGRESS NOTES
LLE Venous doppler study completed. Preliminary negative for DVT and SVT called to Dr. Norris, will call in an antibiotic.

## 2019-10-04 NOTE — PROGRESS NOTES
"Chief Complaint   Patient presents with   • Joint Swelling     left aboiut 2 weeks   • Hypertension     did not take meds today        History of Present Illness   Natalee Klein is a 89 y.o. female presents for acute care. She reports falling about 2 weeks ago. About 1 week ago she noticed swelling and tenderness of left ankle. It is red. bp is quite elevated today. She \"forgot\" her bp medications. She also has increased bp related to coming into the office.     The following portions of the patient's history were reviewed and updated as appropriate: allergies, current medications, past family history, past medical history, past social history, past surgical history and problem list.  Current Outpatient Medications on File Prior to Visit   Medication Sig Dispense Refill   • ACCU-CHEK FASTCLIX LANCETS misc USE 1 DAILY 102 each 2   • ACCU-CHEK SMARTVIEW test strip TEST EVERY  each 3   • acetaminophen (TYLENOL) 500 MG tablet Take 500 mg by mouth every 6 (six) hours as needed for mild pain (1-3).     • Ascorbic Acid (VITAMIN C) 500 MG capsule Take 1 tablet by mouth Daily.     • Blood Glucose Calibration (ACCU-CHEK SMARTVIEW CONTROL VI) Accu-Chek SmartView In Vitro Strip; Patient Sig: Accu-Chek SmartView In Vitro Strip USE 1 TEST STRIP EVERY DAY; 100; 3; 19-Aug-2013; Active     • Cholecalciferol (VITAMIN D PO) Take 1 tablet by mouth Daily.     • diclofenac (VOLTAREN) 1 % gel gel Apply 4 g topically to the appropriate area as directed 4 (Four) Times a Day As Needed (shoulder pain). 300 g 2   • dorzolamide-timolol (COSOPT) 22.3-6.8 MG/ML ophthalmic solution instill 1 drop into affected eye twice a day  0   • glucose blood (ACCU-CHEK SMARTVIEW) test strip Use as instructed. 100 each 3   • glucose blood (ACCU-CHEK SMARTVIEW) test strip Use 1 Test strip every day to test blood glucose. 100 each 3   • Lancets Misc. (ACCU-CHEK FASTCLIX LANCET) kit      • lisinopril (PRINIVIL,ZESTRIL) 40 MG tablet Take 1 tablet by mouth " "Daily. 90 tablet 3   • mupirocin (BACTROBAN) 2 % ointment Apply  topically to the appropriate area as directed Daily. 22 g 1   • travoprost, KAIT free, (TRAVATAN) 0.004 % solution ophthalmic solution Apply  to eye.     • triamcinolone (KENALOG) 0.1 % ointment apply to affected area once daily  0   • Mirabegron ER (MYRBETRIQ) 50 MG tablet sustained-release 24 hour 24 hr tablet Take 50 mg by mouth Daily. 30 tablet 5     No current facility-administered medications on file prior to visit.      Review of Systems   Constitutional: Negative.    HENT: Negative.    Eyes: Negative.    Respiratory: Negative.    Cardiovascular: Negative.    Gastrointestinal: Negative.    Endocrine: Negative.    Genitourinary: Positive for frequency and urgency.        Urinary incont     Musculoskeletal: Positive for arthralgias.   Skin:        Redness     Allergic/Immunologic: Negative.    Neurological: Negative.    Hematological: Negative.    Psychiatric/Behavioral: Negative.        Objective   Physical Exam   Constitutional:   Alert and appropriate. Chronically ill appearing w/ hearing impairment. Mobility issues. Facial drooping/ chronic.    HENT:   Right Ear: External ear normal.   Left Ear: External ear normal.   Eyes: EOM are normal. Pupils are equal, round, and reactive to light.   Cardiovascular: Normal rate, regular rhythm and normal heart sounds.   Slightly decreased pulses. Veinous varicosities B. Left lower extremity w/ erythema, swelling and tenderness. Diameter 9\"  Right ankle diameter 8 1/2 \"   Pulmonary/Chest: Effort normal and breath sounds normal.   Abdominal: Soft. Bowel sounds are normal.   Musculoskeletal:   dereased rom w/ difficulty ambulating chair to exam table and transferring onto table.    Neurological: She is alert.   Skin: Skin is warm. There is erythema.   Left lower extremity erythema to mid calf.    Psychiatric: She has a normal mood and affect. Her behavior is normal. Thought content normal.   Nursing note and " "vitals reviewed.       /88   Pulse 86   Ht 144.8 cm (57\")   Wt 49 kg (108 lb)   BMI 23.37 kg/m²     Assessment/Plan   Diagnoses and all orders for this visit:    Leg swelling  -     Duplex Venous Lower Extremity - Left CAR; Future    Pain of left lower extremity  -     Duplex Venous Lower Extremity - Left CAR; Future    Redness and swelling of lower leg  -     Duplex Venous Lower Extremity - Left CAR; Future        Patient w/ acute left lower extremity edema and swelling. Suspect DVT v cellulitis. Will send for STAT lower extremity doppler. If positive will start anticoagulant. If negative will initiate an antibiotic for cellulitis. Patient was given a tablet of edarbi 40 mg in office related to hypertensive urgency after missing am medications. To increase hydration w/ water, reduce sodium in diet, and monitor bp.            "

## 2019-11-11 ENCOUNTER — TELEPHONE (OUTPATIENT)
Dept: INTERNAL MEDICINE | Facility: CLINIC | Age: 84
End: 2019-11-11

## 2019-11-12 ENCOUNTER — OFFICE VISIT (OUTPATIENT)
Dept: INTERNAL MEDICINE | Facility: CLINIC | Age: 84
End: 2019-11-12

## 2019-11-12 VITALS
BODY MASS INDEX: 23.37 KG/M2 | DIASTOLIC BLOOD PRESSURE: 72 MMHG | HEART RATE: 85 BPM | SYSTOLIC BLOOD PRESSURE: 178 MMHG | WEIGHT: 108 LBS

## 2019-11-12 DIAGNOSIS — I16.0 HYPERTENSIVE URGENCY: Primary | ICD-10-CM

## 2019-11-12 DIAGNOSIS — I10 ESSENTIAL HYPERTENSION: ICD-10-CM

## 2019-11-12 PROCEDURE — 93000 ELECTROCARDIOGRAM COMPLETE: CPT | Performed by: INTERNAL MEDICINE

## 2019-11-12 PROCEDURE — 99214 OFFICE O/P EST MOD 30 MIN: CPT | Performed by: INTERNAL MEDICINE

## 2019-11-12 RX ORDER — METOPROLOL SUCCINATE 25 MG/1
25 TABLET, EXTENDED RELEASE ORAL DAILY
Qty: 30 TABLET | Refills: 5 | Status: SHIPPED | OUTPATIENT
Start: 2019-11-12 | End: 2020-05-07

## 2019-11-12 NOTE — PROGRESS NOTES
"Chief Complaint   Patient presents with   • Hypertension     hypertensive urgency   • Bloated     stool incontinence   • Urinary Incontinence       History of Present Illness   Natalee Klein is a 89 y.o. female presents for acute needs. She is having difficulties w/ her bowels. \"it comes whenever it wants to\". Not diarrhea but not able to control the bm. She stools irregularly- not even daily. She went to   Patient is noted to have very elevated bp. She is taking lisinopril daily. Previously on rx  metoprolol for atrial fibrillation and htn but patient does not recognize this med.   Tried myrbetriq for urinary frequency/ oab. She reports that this caused insomnia.       The following portions of the patient's history were reviewed and updated as appropriate: allergies, current medications, past family history, past medical history, past social history, past surgical history and problem list.  Current Outpatient Medications on File Prior to Visit   Medication Sig Dispense Refill   • ACCU-CHEK FASTCLIX LANCETS misc USE 1 DAILY 102 each 2   • ACCU-CHEK SMARTVIEW test strip TEST EVERY  each 3   • acetaminophen (TYLENOL) 500 MG tablet Take 500 mg by mouth every 6 (six) hours as needed for mild pain (1-3).     • Ascorbic Acid (VITAMIN C) 500 MG capsule Take 1 tablet by mouth Daily.     • Blood Glucose Calibration (ACCU-CHEK SMARTVIEW CONTROL VI) Accu-Chek SmartView In Vitro Strip; Patient Sig: Accu-Chek SmartView In Vitro Strip USE 1 TEST STRIP EVERY DAY; 100; 3; 19-Aug-2013; Active     • Cholecalciferol (VITAMIN D PO) Take 1 tablet by mouth Daily.     • diclofenac (VOLTAREN) 1 % gel gel Apply 4 g topically to the appropriate area as directed 4 (Four) Times a Day As Needed (shoulder pain). 300 g 2   • dorzolamide-timolol (COSOPT) 22.3-6.8 MG/ML ophthalmic solution instill 1 drop into affected eye twice a day  0   • doxycycline (MONODOX) 100 MG capsule Take 1 capsule by mouth 2 (Two) Times a Day. 14 capsule 0   • " glucose blood (ACCU-CHEK SMARTVIEW) test strip Use as instructed. 100 each 3   • glucose blood (ACCU-CHEK SMARTVIEW) test strip Use 1 Test strip every day to test blood glucose. 100 each 3   • Lancets Misc. (ACCU-CHEK FASTCLIX LANCET) kit      • lisinopril (PRINIVIL,ZESTRIL) 40 MG tablet Take 1 tablet by mouth Daily. 90 tablet 3   • mupirocin (BACTROBAN) 2 % ointment Apply  topically to the appropriate area as directed Daily. 22 g 1   • travoprost, KAIT free, (TRAVATAN) 0.004 % solution ophthalmic solution Apply  to eye.     • triamcinolone (KENALOG) 0.1 % ointment apply to affected area once daily  0   • [DISCONTINUED] Mirabegron ER (MYRBETRIQ) 50 MG tablet sustained-release 24 hour 24 hr tablet Take 50 mg by mouth Daily. 30 tablet 5   • [DISCONTINUED] saccharomyces boulardii (FLORASTOR) 250 MG capsule Take 1 capsule by mouth 2 (Two) Times a Day. 20 capsule 0     No current facility-administered medications on file prior to visit.      Review of Systems   Constitutional: Negative.    HENT: Negative.    Eyes: Negative.    Respiratory: Negative.    Cardiovascular: Negative.    Gastrointestinal:        Stool incont  Loose stooling  No diarrhea     Endocrine: Negative.    Genitourinary: Negative.    Musculoskeletal: Negative.    Skin: Negative.    Allergic/Immunologic: Negative.    Neurological: Negative.    Hematological: Negative.    Psychiatric/Behavioral: Negative.        Objective   Physical Exam   Constitutional: She is oriented to person, place, and time.   Alert. Chronically ill appearing. Not acutely distressed   HENT:   Chronic facial droop. Very poor dentition. Hearing impairment   Eyes: Conjunctivae and EOM are normal. Pupils are equal, round, and reactive to light.   Neck: Normal range of motion. Neck supple.   Cardiovascular: Normal rate, regular rhythm, normal heart sounds and intact distal pulses.   Pulmonary/Chest: Effort normal and breath sounds normal.   Abdominal: Soft. Bowel sounds are normal.    Musculoskeletal:   Diffuse OA changes. Difficulty with gait. Difficulty transferring onto table.   Neurological: She is alert and oriented to person, place, and time.   Skin: Skin is warm and dry.   Psychiatric: She has a normal mood and affect. Her behavior is normal. Judgment and thought content normal.   Nursing note and vitals reviewed.       /72   Pulse 85   Wt 49 kg (108 lb)   BMI 23.37 kg/m²   After bystolic 10 mg administered.     Assessment/Plan   Diagnoses and all orders for this visit:    Hypertensive urgency  -     ECG 12 Lead    Essential hypertension    Other orders  -     metoprolol succinate XL (TOPROL XL) 25 MG 24 hr tablet; Take 1 tablet by mouth Daily.    patient w/ acute hypertensive urgency. Will restart toprol daily. She will continue lisinopril daily. Low sodium diet. She is a poor historian and can not really understand what her bowel difficulties are. She is advised a daily fiber to allow for improved bulking of the stool. She declines med for urinary freq. She will f/u routinely and is to monitor bp closely. Total office visit >90 min for observation of bp.

## 2019-12-19 ENCOUNTER — TELEPHONE (OUTPATIENT)
Dept: INTERNAL MEDICINE | Facility: CLINIC | Age: 84
End: 2019-12-19

## 2019-12-19 NOTE — TELEPHONE ENCOUNTER
Mindy SALINAS stated that patient informed her that her BP had been running high. She checked her Bp in the office it was 192/120. Mindy from [facility] called us to asked for the next steps to take. I advised her to have the patient son take patient to the ED.

## 2019-12-20 NOTE — TELEPHONE ENCOUNTER
Called left message for her son.   I have also called patient home number with no answer. I was unable to leave a message at her home.

## 2019-12-26 ENCOUNTER — APPOINTMENT (OUTPATIENT)
Dept: GENERAL RADIOLOGY | Facility: HOSPITAL | Age: 84
End: 2019-12-26

## 2019-12-26 ENCOUNTER — HOSPITAL ENCOUNTER (EMERGENCY)
Facility: HOSPITAL | Age: 84
Discharge: HOME | End: 2019-12-26
Attending: EMERGENCY MEDICINE | Admitting: EMERGENCY MEDICINE

## 2019-12-26 VITALS
TEMPERATURE: 98.1 F | RESPIRATION RATE: 16 BRPM | BODY MASS INDEX: 23.37 KG/M2 | OXYGEN SATURATION: 96 % | SYSTOLIC BLOOD PRESSURE: 184 MMHG | HEIGHT: 57 IN | HEART RATE: 91 BPM | DIASTOLIC BLOOD PRESSURE: 118 MMHG

## 2019-12-26 DIAGNOSIS — M70.32 BURSITIS OF LEFT ELBOW, UNSPECIFIED BURSA: ICD-10-CM

## 2019-12-26 DIAGNOSIS — S22.41XA CLOSED FRACTURE OF MULTIPLE RIBS OF RIGHT SIDE, INITIAL ENCOUNTER: ICD-10-CM
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
## 2019-12-26 PROCEDURE — 99282 EMERGENCY DEPT VISIT SF MDM: CPT

## 2019-12-26 PROCEDURE — 71101 X-RAY EXAM UNILAT RIBS/CHEST: CPT

## 2019-12-26 RX ORDER — LIDOCAINE 50 MG/G
1 PATCH TOPICAL EVERY 24 HOURS
Qty: 30 EACH | Refills: 0 | Status: SHIPPED | OUTPATIENT
Start: 2019-12-26

## 2020-01-06 ENCOUNTER — OFFICE VISIT (OUTPATIENT)
Dept: INTERNAL MEDICINE | Facility: CLINIC | Age: 85
End: 2020-01-06

## 2020-01-06 VITALS
SYSTOLIC BLOOD PRESSURE: 186 MMHG | BODY MASS INDEX: 23.8 KG/M2 | DIASTOLIC BLOOD PRESSURE: 86 MMHG | OXYGEN SATURATION: 96 % | WEIGHT: 110 LBS | HEART RATE: 67 BPM

## 2020-01-06 DIAGNOSIS — M25.522 LEFT ELBOW PAIN: Primary | ICD-10-CM

## 2020-01-06 DIAGNOSIS — M25.422 EFFUSION OF LEFT ELBOW: ICD-10-CM

## 2020-01-06 DIAGNOSIS — I10 ESSENTIAL HYPERTENSION: ICD-10-CM
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
## 2020-01-06 DIAGNOSIS — R26.89 ABNORMALITY OF GAIT DUE TO IMPAIRMENT OF BALANCE: ICD-10-CM

## 2020-01-06 PROCEDURE — 99214 OFFICE O/P EST MOD 30 MIN: CPT | Performed by: INTERNAL MEDICINE

## 2020-01-06 RX ORDER — CLONIDINE 0.1 MG/24H
1 PATCH, EXTENDED RELEASE TRANSDERMAL WEEKLY
Qty: 4 PATCH | Refills: 4 | Status: SHIPPED | OUTPATIENT
Start: 2020-01-06 | End: 2020-02-04

## 2020-01-06 NOTE — PROGRESS NOTES
"Chief Complaint   Patient presents with   • Fall     2 broken ribs before xmas er after xmas       History of Present Illness   Natalee Klein is a 89 y.o. female presents for acute and chronic needs. She reports that she has \"a million and one problems\" and has challenges of her whole body. Main concern is of swelling of her elbow. She has urinary incontinence and frequency. Leakage most pronounced at night. She has arthritis. She has uncontrolled hypertension. She fell one week ago. Went to [facility] ER 12/26 and was found to have 2 broken ribs. She was given lidocaine patch but reports her ribs are not painful so she is not using this. She lives in an assisted living facility independently at this time. She was home alone at time of the fall.         The following portions of the patient's history were reviewed and updated as appropriate: allergies, current medications, past family history, past medical history, past social history, past surgical history and problem list.  Current Outpatient Medications on File Prior to Visit   Medication Sig Dispense Refill   • ACCU-CHEK FASTCLIX LANCETS misc USE 1 DAILY 102 each 2   • ACCU-CHEK SMARTVIEW test strip TEST EVERY  each 3   • acetaminophen (TYLENOL) 500 MG tablet Take 500 mg by mouth every 6 (six) hours as needed for mild pain (1-3).     • Ascorbic Acid (VITAMIN C) 500 MG capsule Take 1 tablet by mouth Daily.     • Blood Glucose Calibration (ACCU-CHEK SMARTVIEW CONTROL VI) Accu-Chek SmartView In Vitro Strip; Patient Sig: Accu-Chek SmartView In Vitro Strip USE 1 TEST STRIP EVERY DAY; 100; 3; 19-Aug-2013; Active     • Cholecalciferol (VITAMIN D PO) Take 1 tablet by mouth Daily.     • diclofenac (VOLTAREN) 1 % gel gel Apply 4 g topically to the appropriate area as directed 4 (Four) Times a Day As Needed (shoulder pain). 300 g 2   • dorzolamide-timolol (COSOPT) 22.3-6.8 MG/ML ophthalmic solution instill 1 drop into affected eye twice a day  0   • doxycycline " "(MONODOX) 100 MG capsule Take 1 capsule by mouth 2 (Two) Times a Day. 14 capsule 0   • glucose blood (ACCU-CHEK SMARTVIEW) test strip Use as instructed. 100 each 3   • glucose blood (ACCU-CHEK SMARTVIEW) test strip Use 1 Test strip every day to test blood glucose. 100 each 3   • Lancets Misc. (ACCU-CHEK FASTCLIX LANCET) kit      • lidocaine (LIDODERM) 5 % Place 1 patch on the skin as directed by provider Daily. Remove & Discard patch within 12 hours or as directed by MD 30 each 0   • lisinopril (PRINIVIL,ZESTRIL) 40 MG tablet Take 1 tablet by mouth Daily. 90 tablet 3   • metoprolol succinate XL (TOPROL XL) 25 MG 24 hr tablet Take 1 tablet by mouth Daily. 30 tablet 5   • mupirocin (BACTROBAN) 2 % ointment Apply  topically to the appropriate area as directed Daily. 22 g 1   • travoprost, KAIT free, (TRAVATAN) 0.004 % solution ophthalmic solution Apply  to eye.     • triamcinolone (KENALOG) 0.1 % ointment apply to affected area once daily  0     No current facility-administered medications on file prior to visit.      Review of Systems   Constitutional: Positive for fatigue.   HENT: Negative.    Eyes: Negative.    Respiratory: Negative.    Cardiovascular: Negative.    Gastrointestinal:        The bowels are \"regular as normal but it will come on its own\".    Endocrine: Negative.    Genitourinary: Positive for frequency and urgency.   Skin: Negative.    Allergic/Immunologic: Negative.    Hematological: Negative.    Psychiatric/Behavioral: Negative.        Objective   Physical Exam   Constitutional: She is oriented to person, place, and time.   Thin wf, chronic facial droop, walker to ambulate   HENT:   Head: Normocephalic and atraumatic.   Right Ear: External ear normal.   Left Ear: External ear normal.   Mouth/Throat: Oropharynx is clear and moist.   Eyes: Pupils are equal, round, and reactive to light. EOM are normal.   Neck: Normal range of motion. Neck supple.   Cardiovascular: Normal rate.   irreg irreg "   Pulmonary/Chest: Effort normal and breath sounds normal.   Abdominal: Soft. Bowel sounds are normal.   Musculoskeletal:   Diffuse OA changes  Left elbow w/ effusion   Neurological: She is alert and oriented to person, place, and time.   Skin: Skin is warm and dry.   Psychiatric: She has a normal mood and affect. Her behavior is normal. Judgment and thought content normal.   Nursing note and vitals reviewed.       BP (!) 186/86   Pulse 67   Wt 49.9 kg (110 lb)   SpO2 96%   BMI 23.80 kg/m²     Assessment/Plan   Diagnoses and all orders for this visit:    Left elbow pain  -     Ambulatory Referral to Orthopedic Surgery    Effusion of left elbow  -     Ambulatory Referral to Orthopedic Surgery    Essential hypertension    Other orders  -     cloNIDine (CATAPRES-TTS) 0.1 MG/24HR patch; Place 1 patch on the skin as directed by provider 1 (One) Time Per Week.    patient w/ left elbow pain and effusion. Will refer to ortho for eval and treatment of this. Patient has persistent elevation of bp and will start a catapres patch. Will continue metoprolol and lisinopril. Will have her restart physical therapy with fall precautions to avoid future falls. She will use lidocaine prn if any rib pain (declines at this time) routine follow up. Monitor bp 3 times weekly.

## 2020-01-09 ENCOUNTER — TELEPHONE (OUTPATIENT)
Dept: ORTHOPEDIC SURGERY | Facility: CLINIC | Age: 85
End: 2020-01-09

## 2020-01-09 ENCOUNTER — OFFICE VISIT (OUTPATIENT)
Dept: ORTHOPEDIC SURGERY | Facility: CLINIC | Age: 85
End: 2020-01-09

## 2020-01-09 VITALS — WEIGHT: 110 LBS | BODY MASS INDEX: 23.73 KG/M2 | TEMPERATURE: 97.5 F | HEIGHT: 57 IN

## 2020-01-09 DIAGNOSIS — R80.9 TYPE 2 DIABETES MELLITUS WITH MICROALBUMINURIA, WITHOUT LONG-TERM CURRENT USE OF INSULIN (HCC): ICD-10-CM

## 2020-01-09 DIAGNOSIS — R52 PAIN: Primary | ICD-10-CM

## 2020-01-09 DIAGNOSIS — M71.50 TRAUMATIC BURSITIS: ICD-10-CM

## 2020-01-09 DIAGNOSIS — M25.522 LEFT ELBOW PAIN: ICD-10-CM

## 2020-01-09 DIAGNOSIS — E11.29 TYPE 2 DIABETES MELLITUS WITH MICROALBUMINURIA, WITHOUT LONG-TERM CURRENT USE OF INSULIN (HCC): ICD-10-CM

## 2020-01-09 DIAGNOSIS — I48.0 PAROXYSMAL ATRIAL FIBRILLATION (HCC): ICD-10-CM

## 2020-01-09 PROCEDURE — 73070 X-RAY EXAM OF ELBOW: CPT | Performed by: NURSE PRACTITIONER

## 2020-01-09 PROCEDURE — 20605 DRAIN/INJ JOINT/BURSA W/O US: CPT | Performed by: NURSE PRACTITIONER

## 2020-01-09 PROCEDURE — 99203 OFFICE O/P NEW LOW 30 MIN: CPT | Performed by: NURSE PRACTITIONER

## 2020-01-09 RX ORDER — NETARSUDIL 0.2 MG/ML
SOLUTION/ DROPS OPHTHALMIC; TOPICAL
COMMUNITY
Start: 2020-01-03

## 2020-01-09 NOTE — TELEPHONE ENCOUNTER
"IMPACT chart [id number] [facility]    Son called asking for [facility] appt today or tomorrow for left elbow that \"needs drained\". There is a referral in [facility] from 1/6 from Dr. Gracy Norris. Patient went to [facility] ER on 12/26 post fall and noted she had swollen elbow then. Saw Dr. Norris on 1/6. Please advise.  "

## 2020-01-09 NOTE — PATIENT INSTRUCTIONS
Bursitis    Bursitis is when the fluid-filled sac (bursa) that covers and protects a joint is swollen (inflamed). Bursitis is most common near joints such as the knees, elbows, hips, and shoulders. It can cause pain and stiffness.  Follow these instructions at home:  Medicines  · Take over-the-counter and prescription medicines only as told by your doctor.  · If you were prescribed an antibiotic medicine, take it as told by your doctor. Do not stop taking it even if you start to feel better.  General instructions    · Rest the affected area as told by your doctor.  ? If you can, raise (elevate) the affected area above the level of your heart while you are sitting or lying down.  ? Avoid doing things that make the pain worse.  · Use a splint, brace, pad, or walking aid as told by your doctor.  · If directed, put ice on the affected area:  ? If you have a removable splint or brace, take it off as told by your doctor.  ? Put ice in a plastic bag.  ? Place a towel between your skin and the bag, or between the splint or brace and the bag.  ? Leave the ice on for 20 minutes, 2-3 times a day.  · Keep all follow-up visits as told by your doctor. This is important.  Preventing symptoms  Do these things to help you not have symptoms again:  · Wear knee pads if you kneel often.  · Wear sturdy running or walking shoes that fit you well.  · Take a lot of breaks during activities that involve doing the same movements again and again.  · Before you do any activity that takes a lot of effort, get your body ready by stretching.  · Stay at a healthy weight or lose weight if your doctor says you should. If you need help doing this, ask your doctor.  · Exercise often. If you start any new physical activity, do it slowly.  Contact a doctor if you:  · Have a fever.  · Have chills.  · Have symptoms that do not get better with treatment or home care.  Summary  · Bursitis is when the fluid-filled sac (bursa) that covers and protects a joint  is swollen.  · Rest the affected area as told by your doctor.  · Avoid doing things that make the pain worse.  · Put ice on the affected area as told by your doctor.  This information is not intended to replace advice given to you by your health care provider. Make sure you discuss any questions you have with your health care provider.  Document Released: 06/07/2011 Document Revised: 11/02/2018 Document Reviewed: 11/02/2018  [employer] Patient Education © 2019 [employer]

## 2020-01-09 NOTE — PROGRESS NOTES
Patient Name: Natalee Klein   YOB: 1930  Referring Primary Care Physician: Gracy Norris MD  BMI: Body mass index is 23.8 kg/m².    Chief Complaint:    Chief Complaint   Patient presents with   • Left Elbow - Establish Care, Pain        HPI: New patient to me presents with her son status post fall on [place] went to the emergency room was found to have a couple broken ribs does not know if she hit her elbow but presents with a swollen elbow referred by her PCP.  Patient lives in assisted living.  She denies hitting her head and is not on a blood thinner.    Natalee Klein is a 89 y.o. female who presents today for evaluation of   Chief Complaint   Patient presents with   • Left Elbow - Establish Care, Pain   .    This problem  new to this examiner.     Subjective   Medications:   Home Medications:  Current Outpatient Medications on File Prior to Visit   Medication Sig   • ACCU-CHEK FASTCLIX LANCETS misc USE 1 DAILY   • ACCU-CHEK SMARTVIEW test strip TEST EVERY DAY   • acetaminophen (TYLENOL) 500 MG tablet Take 500 mg by mouth every 6 (six) hours as needed for mild pain (1-3).   • Ascorbic Acid (VITAMIN C) 500 MG capsule Take 1 tablet by mouth Daily.   • Blood Glucose Calibration (ACCU-CHEK SMARTVIEW CONTROL VI) Accu-Chek SmartView In Vitro Strip; Patient Sig: Accu-Chek SmartView In Vitro Strip USE 1 TEST STRIP EVERY DAY; 100; 3; 19-Aug-2013; Active   • Cholecalciferol (VITAMIN D PO) Take 1 tablet by mouth Daily.   • cloNIDine (CATAPRES-TTS) 0.1 MG/24HR patch Place 1 patch on the skin as directed by provider 1 (One) Time Per Week.   • diclofenac (VOLTAREN) 1 % gel gel Apply 4 g topically to the appropriate area as directed 4 (Four) Times a Day As Needed (shoulder pain).   • dorzolamide-timolol (COSOPT) 22.3-6.8 MG/ML ophthalmic solution instill 1 drop into affected eye twice a day   • doxycycline (MONODOX) 100 MG capsule Take 1 capsule by mouth 2 (Two) Times a Day.   • glucose blood (ACCU-CHEK  SMARTVIEW) test strip Use as instructed.   • glucose blood (ACCU-CHEK SMARTVIEW) test strip Use 1 Test strip every day to test blood glucose.   • Lancets Misc. (ACCU-CHEK FASTCLIX LANCET) kit    • lidocaine (LIDODERM) 5 % Place 1 patch on the skin as directed by provider Daily. Remove & Discard patch within 12 hours or as directed by MD   • lisinopril (PRINIVIL,ZESTRIL) 40 MG tablet Take 1 tablet by mouth Daily.   • metoprolol succinate XL (TOPROL XL) 25 MG 24 hr tablet Take 1 tablet by mouth Daily.   • mupirocin (BACTROBAN) 2 % ointment Apply  topically to the appropriate area as directed Daily.   • RHOPRESSA 0.02 % solution    • travoprost, KAIT free, (TRAVATAN) 0.004 % solution ophthalmic solution Apply  to eye.   • triamcinolone (KENALOG) 0.1 % ointment apply to affected area once daily     No current facility-administered medications on file prior to visit.      Current Medications:  Scheduled Meds:  Continuous Infusions:  No current facility-administered medications for this visit.   PRN Meds:.    I have reviewed the patient's medical history in detail and updated the computerized patient record.  Review and summarization of old records includes:    Past Medical History:   Diagnosis Date   • Carotid artery stenosis    • Colon polyp 05/17/2007    tubular adenoma, 60 cm   • Colon polyp 11/11/2003    tubular adenoma, cecum   • DDD (degenerative disc disease), lumbar    • Diabetes mellitus (CMS/HCC)    • Disease of thyroid gland    • Hyperlipidemia    • Hypertension    • Hypokalemia    • Irregular heart beat    • Osteopenia    • PAF (paroxysmal atrial fibrillation) (CMS/HCC)    • Pruritus    • Sciatica    • TIA (transient ischemic attack)         Past Surgical History:   Procedure Laterality Date   • CATARACT EXTRACTION Bilateral     Dr. Vivar   • CHOLECYSTECTOMY N/A 05/09/2001    Dr. Adam Peterson   • COLONOSCOPY W/ POLYPECTOMY N/A 11/11/2003    Small cecal polyp-Dr. Adam Peterson   • COLONOSCOPY W/  "POLYPECTOMY N/A 05/17/2007    Non-thrombosed external hemorrhoids, diverticulosis, one 7 mm, non-bleeding polyp at 60 cm proximal to the anus-Dr. Adam Peterson   • FOOT SURGERY     • HEMORRHOIDECTOMY N/A 02/09/2004    Dr. Carlos Hyman   • HYSTERECTOMY  1980   • NEPHRECTOMY Right 2000   • NEUROMA SURGERY Left     excision of acoustic neuroma   • THYROIDECTOMY, PARTIAL  1975   • WRIST SURGERY          Social History     Occupational History   • Not on file   Tobacco Use   • Smoking status: Former Smoker   • Smokeless tobacco: Never Used   Substance and Sexual Activity   • Alcohol use: No   • Drug use: No   • Sexual activity: Defer      Social History     Social History Narrative   • Not on file        Family History   Problem Relation Age of Onset   • Stroke Brother    • Breast cancer Other    • Heart disease Other    • Breast cancer Mother    • Mitral valve prolapse Neg Hx        ROS: 14 point review of systems was performed and all other systems were reviewed and are negative except for documented findings in HPI and today's encounter.     Allergies:   Allergies   Allergen Reactions   • Amlodipine Rash   • Codeine Rash   • Penicillins Rash     Constitutional:  Denies fever, shaking or chills   Eyes:  Denies change in visual acuity   HENT:  Denies nasal congestion or sore throat   Respiratory:  Denies cough or shortness of breath   Cardiovascular:  Denies chest pain or severe LE edema   GI:  Denies abdominal pain, nausea, vomiting, bloody stools or diarrhea   Musculoskeletal:  Numbness, tingling, pain, or loss of motor function only as noted above in history of present illness.  : Denies painful urination or hematuria  Integument:  Denies rash, lesion or ulceration   Neurologic:  Denies headache or focal weakness  Endocrine:  Denies lymphadenopathy  Psych:  Denies confusion or change in mental status   Hem:  Denies active bleeding    OBJECTIVE:  Physical Exam:   Temp 97.5 °F (36.4 °C)   Ht 144.8 cm (57\")   " Wt 49.9 kg (110 lb)   BMI 23.80 kg/m²     General Appearance:    Alert, cooperative, in no acute distress                  Eyes: conjunctiva clear  ENT: external ears and nose atraumatic  CV: no peripheral edema  Resp: normal respiratory effort  Skin: no rashes or wounds; normal turgor  Psych: mood and affect appropriate  Lymph: no nodes appreciated  Neuro: gross sensation intact  Vascular:  Palpable peripheral pulse in noted extremity  Musculoskeletal Extremities: Bursitis to the left elbow with some ecchymosis.  Patient has good range of motion and no pain with supination shoulders nontender wrist is nontender skin is warm dry intact there is no erythema warmth she is afebrile no signs symptoms of infection    Radiology:   Left elbow x-rays were done reveal no acute fracture no readily available views for comparison done for swelling and pain    Assessment:     ICD-10-CM ICD-9-CM   1. Pain R52 780.96   2. Traumatic bursitis M71.50 727.3   3. Left elbow pain M25.522 719.42   4. Paroxysmal atrial fibrillation (CMS/HCC) I48.0 427.31   5. Type 2 diabetes mellitus with microalbuminuria, without long-term current use of insulin (CMS/[facility]) E11.29 250.40    R80.9 791.0        Medium Joint Arthrocentesis: L olecranon bursa  Date/Time: 1/9/2020 4:03 PM  Consent given by: patient  Timeout: Immediately prior to procedure a time out was called to verify the correct patient, procedure, equipment, support staff and site/side marked as required   Supporting Documentation  Indications: joint swelling   Procedure Details  Location: elbow - L olecranon bursa  Preparation: Patient was prepped and draped in the usual sterile fashion  Needle size: 22 G  Approach: anterolateral  Aspirate amount: 60 mL  Aspirate: serous  Patient tolerance: patient tolerated the procedure well with no immediate complications             Plan:  15 min spent face to face with patient 15 min spent counseling about:  RICE: Rest, ice, compression, and  elevation therapy, Cryotherapy/brachy therapy, and or OTC linaments as indicated with instructions.   Patient was complete with serous fluid that became bloody no signs of infection patient tolerated procedure.  Advised to keep an Ace wrap and some heat and call with any signs of infection    1/9/2020    Much of this encounter note is an electronic transcription/translation of spoken language to printed text. The electronic translation of spoken language may permit erroneous, or at times, nonsensical words or phrases to be inadvertently transcribed; Although I have reviewed the note for such errors, some may still exist

## 2020-01-09 NOTE — TELEPHONE ENCOUNTER
Sorry but my schedule is full.  Please see if she can get into see [facility] or 1 of the sports docs at [place].

## 2020-01-27 ENCOUNTER — TELEPHONE (OUTPATIENT)
Dept: INTERNAL MEDICINE | Facility: CLINIC | Age: 85
End: 2020-01-27

## 2020-01-27 DIAGNOSIS — M19.90 OSTEOARTHRITIS, UNSPECIFIED OSTEOARTHRITIS TYPE, UNSPECIFIED SITE: ICD-10-CM

## 2020-01-27 DIAGNOSIS — E11.29 TYPE 2 DIABETES MELLITUS WITH MICROALBUMINURIA, WITHOUT LONG-TERM CURRENT USE OF INSULIN (HCC): ICD-10-CM

## 2020-01-27 DIAGNOSIS — I10 ESSENTIAL HYPERTENSION: Primary | ICD-10-CM

## 2020-01-27 DIAGNOSIS — R26.89 ABNORMALITY OF GAIT DUE TO IMPAIRMENT OF BALANCE: ICD-10-CM

## 2020-01-27 DIAGNOSIS — R80.9 TYPE 2 DIABETES MELLITUS WITH MICROALBUMINURIA, WITHOUT LONG-TERM CURRENT USE OF INSULIN (HCC): ICD-10-CM

## 2020-01-27 NOTE — TELEPHONE ENCOUNTER
- Multifactorial related to increased age and multiple co-morbidities  - Continue assistance with ADL's  - Monitor skin integrity  - Continue restorative therapy  - Fall precautions Aleksandar at home needs orders for Nursing. Help with BP and med kasey     Fax [phone]

## 2020-02-04 ENCOUNTER — TELEPHONE (OUTPATIENT)
Dept: INTERNAL MEDICINE | Facility: CLINIC | Age: 85
End: 2020-02-04

## 2020-02-04 RX ORDER — CLONIDINE 0.2 MG/24H
1 PATCH, EXTENDED RELEASE TRANSDERMAL WEEKLY
Qty: 4 PATCH | Refills: 3 | Status: SHIPPED | OUTPATIENT
Start: 2020-02-04 | End: 2020-06-24

## 2020-02-04 NOTE — TELEPHONE ENCOUNTER
Please increase catapres patch to . 2 mg patch. (can use 2 of the . 1 mg or can get new dosing) patch to be changed weekly. Continue metoprolol and lisinopril. F/u next week.

## 2020-02-04 NOTE — TELEPHONE ENCOUNTER
Aleksandar JAREN called and states her BP L arm 192/98   Right arm 178/100    They state she is very anxious about being in there and wants to go home. She feels that may contribute a little to the elevated BP.    Pt is taking the metoprolol, lisinopril and clonidine           [phone]

## 2020-02-10 ENCOUNTER — TELEPHONE (OUTPATIENT)
Dept: INTERNAL MEDICINE | Facility: CLINIC | Age: 85
End: 2020-02-10

## 2020-02-10 NOTE — TELEPHONE ENCOUNTER
Follow up on urine results and schedule an office visit if needed for bp/ etc. Obviously if true chest pain to ED.

## 2020-02-10 NOTE — TELEPHONE ENCOUNTER
HH called Friday and left message that pt was having UTI sx and was asking for a verbal to heck urine. Also pt was complaining of chest pains. No other sx with it. Just a sudden discomfort.   I was out Friday.    Called and spoke with HH this morning and gave a verbal to check urine. Ask about chest pain if she was taking to ER HH said son did not allow her to go. Says this is a ongoing complaint and he doesn't see that she needed to go to ER.     HH will check BP today and Friday and let me know as she just started her increased  Rx. For chest pain she will ask and if so will make an appt to see you.

## 2020-02-12 RX ORDER — NITROFURANTOIN 25; 75 MG/1; MG/1
100 CAPSULE ORAL 2 TIMES DAILY
Qty: 14 CAPSULE | Refills: 0 | Status: SHIPPED | OUTPATIENT
Start: 2020-02-12 | End: 2020-04-15

## 2020-02-24 ENCOUNTER — TELEPHONE (OUTPATIENT)
Dept: INTERNAL MEDICINE | Facility: CLINIC | Age: 85
End: 2020-02-24

## 2020-02-24 NOTE — TELEPHONE ENCOUNTER
[facility] LM that pts BP is running 150s-160s over 80s and 90s. After starting the increased clonidine patch

## 2020-04-10 RX ORDER — CIPROFLOXACIN 500 MG/1
500 TABLET, FILM COATED ORAL 2 TIMES DAILY
Qty: 10 TABLET | Refills: 0 | Status: SHIPPED | OUTPATIENT
Start: 2020-04-10 | End: 2020-04-15

## 2020-04-15 ENCOUNTER — TELEPHONE (OUTPATIENT)
Dept: INTERNAL MEDICINE | Facility: CLINIC | Age: 85
End: 2020-04-15

## 2020-04-15 RX ORDER — SULFAMETHOXAZOLE AND TRIMETHOPRIM 800; 160 MG/1; MG/1
1 TABLET ORAL 2 TIMES DAILY
Qty: 14 TABLET | Refills: 0 | Status: SHIPPED | OUTPATIENT
Start: 2020-04-15

## 2020-04-15 NOTE — TELEPHONE ENCOUNTER
Spoke with Home Health nurse.     She stated patient is symptomatic.     Has frequency and burning, she is hallucinating at night.

## 2020-04-15 NOTE — TELEPHONE ENCOUNTER
Piedad Hicks  Home Health RN  phone   [phone]    Ciprofloxacin  mg oral 2 times day    Patient is not tolerating the medication after 2 doses, made the patient very ill.  Patient is also hallucinating and stopped taking the Cipro.    U/A results 20,000-30,000 growth no antibiotic sensitivity    Would like another antibiotic called to pharmacy -[address]    Note--patient allergic to penicillin    Please contact Home Health RN when medication is called in and she will pick it up    Home Health advised Dr. Maier is covering provider in office today and will be CC'd on telephone encounter.

## 2020-05-07 RX ORDER — LISINOPRIL 40 MG/1
TABLET ORAL
Qty: 90 TABLET | Refills: 3 | Status: SHIPPED | OUTPATIENT
Start: 2020-05-07

## 2020-05-07 RX ORDER — METOPROLOL SUCCINATE 25 MG/1
TABLET, EXTENDED RELEASE ORAL
Qty: 90 TABLET | Refills: 1 | Status: SHIPPED | OUTPATIENT
Start: 2020-05-07

## 2021-03-02 DIAGNOSIS — Z23 IMMUNIZATION DUE: ICD-10-CM
